# Patient Record
Sex: MALE | Race: WHITE
[De-identification: names, ages, dates, MRNs, and addresses within clinical notes are randomized per-mention and may not be internally consistent; named-entity substitution may affect disease eponyms.]

---

## 2023-12-11 ENCOUNTER — HOSPITAL ENCOUNTER (OUTPATIENT)
Dept: HOSPITAL 103 - HO.LNP | Age: 63
Discharge: HOME | End: 2023-12-11
Payer: MEDICARE

## 2023-12-11 DIAGNOSIS — E78.1: ICD-10-CM

## 2023-12-11 DIAGNOSIS — Z00.00: Primary | ICD-10-CM

## 2023-12-11 DIAGNOSIS — R73.03: ICD-10-CM

## 2023-12-11 DIAGNOSIS — I10: ICD-10-CM

## 2023-12-11 DIAGNOSIS — Z12.5: ICD-10-CM

## 2023-12-11 LAB
ALANINE AMINOTRANSFERASE: 48 U/L (ref 0–40)
ALBUMIN LEVEL: 4.1 G/DL (ref 3.5–5)
ALKALINE PHOSPHATASE: 67 U/L (ref 39–117)
ANION GAP: 15 (ref 12–20)
ASPARTATE AMINO TRANSFERASE: 44 U/L (ref 5–37)
BLOOD UREA NITROGEN: 20 MG/DL (ref 9–16)
CALCIUM: 9.4 MG/DL (ref 8.4–10.2)
CARBON DIOXIDE: 25 MMOL/L (ref 22–29)
CHLORIDE: 106 MMOL/L (ref 96–108)
CHOLESTEROL: 198 MG/DL (ref ?–200)
ESTIMATED GLOMERULAR FILT RATE: > 60
HDL CHOLESTEROL: 35 MG/DL (ref 40–?)
HEMATOCRIT: 42.8 % (ref 42–52)
HEMOGLOBIN: 14.6 G/DL (ref 14–18)
IMM GRANULOCYTES # BLD: 0.02 X10*3/UL (ref 0–0.03)
IMM GRANULOCYTES NFR BLD AUTO: 0.3 % (ref 0–0.4)
LYMPHOCYTES  ABSOLUTE AUTO: 2.6 X10*3/UL (ref 1.2–4.9)
MANUAL DIFF FLAG: NO
MEAN CORPUSCULAR HEMOGLOBIN: 32.3 PG (ref 27–33)
MEAN CORPUSCULAR HGB CONC: 34.1 G/DL (ref 31–36)
MEAN CORPUSCULAR VOLUME: 94.7 FL (ref 80–98)
MICROALBUM/CREATININE RATIO UR: (no result) UG/MG CR (ref ?–30)
NRBC ABS AUTO: 0 X10*3/UL (ref 0–0.01)
NRBC PCT AUTO: 0 /100WBC (ref 0–0.2)
PH SPEC: 5.5 [PH] (ref 5–9)
PLATELET COUNT: 268 X10*3/UL (ref 160–400)
POTASSIUM: 4 MMOL/L (ref 3.3–5.1)
PSA SERPL-MCNC: 1.49 NG/ML (ref 0–4)
RED BLOOD COUNT: 4.52 X10*6/UL (ref 4.6–5.8)
SODIUM: 142 MMOL/L (ref 135–145)
SP GR UR STRIP: 1.01 (ref 1–1.02)
TOTAL PROTEIN: 7.2 G/DL (ref 6.5–8)
TRIGLYCERIDES: 297 MG/DL (ref ?–150)
WHITE BLOOD COUNT: 6.3 X10*3/UL (ref 4.8–10.8)

## 2023-12-11 PROCEDURE — 83036 HEMOGLOBIN GLYCOSYLATED A1C: CPT

## 2023-12-11 PROCEDURE — 82570 ASSAY OF URINE CREATININE: CPT

## 2023-12-11 PROCEDURE — 82043 UR ALBUMIN QUANTITATIVE: CPT

## 2023-12-11 PROCEDURE — 80061 LIPID PANEL: CPT

## 2023-12-11 PROCEDURE — 80053 COMPREHEN METABOLIC PANEL: CPT

## 2023-12-11 PROCEDURE — 85025 COMPLETE CBC W/AUTO DIFF WBC: CPT

## 2023-12-11 PROCEDURE — 84153 ASSAY OF PSA TOTAL: CPT

## 2023-12-11 PROCEDURE — 81001 URINALYSIS AUTO W/SCOPE: CPT

## 2024-03-08 ENCOUNTER — APPOINTMENT (RX ONLY)
Dept: URBAN - METROPOLITAN AREA CLINIC 100 | Facility: CLINIC | Age: 64
Setting detail: DERMATOLOGY
End: 2024-03-08

## 2024-03-08 DIAGNOSIS — L57.0 ACTINIC KERATOSIS: ICD-10-CM

## 2024-03-08 DIAGNOSIS — D22 MELANOCYTIC NEVI: ICD-10-CM

## 2024-03-08 DIAGNOSIS — L81.4 OTHER MELANIN HYPERPIGMENTATION: ICD-10-CM

## 2024-03-08 DIAGNOSIS — L82.1 OTHER SEBORRHEIC KERATOSIS: ICD-10-CM

## 2024-03-08 DIAGNOSIS — D49.2 NEOPLASM OF UNSPECIFIED BEHAVIOR OF BONE, SOFT TISSUE, AND SKIN: ICD-10-CM

## 2024-03-08 DIAGNOSIS — Z85.820 PERSONAL HISTORY OF MALIGNANT MELANOMA OF SKIN: ICD-10-CM

## 2024-03-08 PROBLEM — D22.61 MELANOCYTIC NEVI OF RIGHT UPPER LIMB, INCLUDING SHOULDER: Status: ACTIVE | Noted: 2024-03-08

## 2024-03-08 PROBLEM — D22.62 MELANOCYTIC NEVI OF LEFT UPPER LIMB, INCLUDING SHOULDER: Status: ACTIVE | Noted: 2024-03-08

## 2024-03-08 PROBLEM — D22.5 MELANOCYTIC NEVI OF TRUNK: Status: ACTIVE | Noted: 2024-03-08

## 2024-03-08 PROBLEM — D23.62 OTHER BENIGN NEOPLASM OF SKIN OF LEFT UPPER LIMB, INCLUDING SHOULDER: Status: ACTIVE | Noted: 2024-03-08

## 2024-03-08 PROCEDURE — 17003 DESTRUCT PREMALG LES 2-14: CPT

## 2024-03-08 PROCEDURE — 17000 DESTRUCT PREMALG LESION: CPT | Mod: 59

## 2024-03-08 PROCEDURE — ? BIOPSY BY SHAVE METHOD

## 2024-03-08 PROCEDURE — 11102 TANGNTL BX SKIN SINGLE LES: CPT

## 2024-03-08 PROCEDURE — ? LIQUID NITROGEN

## 2024-03-08 PROCEDURE — 99203 OFFICE O/P NEW LOW 30 MIN: CPT | Mod: 25

## 2024-03-08 PROCEDURE — ? COUNSELING

## 2024-03-08 PROCEDURE — ? DIAGNOSIS COMMENT

## 2024-03-08 ASSESSMENT — LOCATION ZONE DERM
LOCATION ZONE: FACE
LOCATION ZONE: NECK
LOCATION ZONE: ARM
LOCATION ZONE: LEG
LOCATION ZONE: TRUNK

## 2024-03-08 ASSESSMENT — LOCATION DETAILED DESCRIPTION DERM
LOCATION DETAILED: LEFT SUPERIOR UPPER BACK
LOCATION DETAILED: SUPERIOR THORACIC SPINE
LOCATION DETAILED: RIGHT SUPERIOR UPPER BACK
LOCATION DETAILED: LEFT PROXIMAL POSTERIOR UPPER ARM
LOCATION DETAILED: STERNUM
LOCATION DETAILED: RIGHT LATERAL MALAR CHEEK
LOCATION DETAILED: RIGHT PROXIMAL POSTERIOR UPPER ARM
LOCATION DETAILED: RIGHT SUPERIOR LATERAL NECK
LOCATION DETAILED: LEFT LATERAL ABDOMEN
LOCATION DETAILED: RIGHT PROXIMAL CALF

## 2024-03-08 ASSESSMENT — LOCATION SIMPLE DESCRIPTION DERM
LOCATION SIMPLE: LEFT UPPER ARM
LOCATION SIMPLE: ABDOMEN
LOCATION SIMPLE: RIGHT UPPER BACK
LOCATION SIMPLE: RIGHT CHEEK
LOCATION SIMPLE: LEFT UPPER BACK
LOCATION SIMPLE: RIGHT CALF
LOCATION SIMPLE: UPPER BACK
LOCATION SIMPLE: CHEST
LOCATION SIMPLE: RIGHT UPPER ARM
LOCATION SIMPLE: NECK

## 2024-03-08 NOTE — PROCEDURE: LIQUID NITROGEN
Show Aperture Variable?: Yes
Detail Level: Detailed
Render Post-Care Instructions In Note?: no
Consent: The patient's consent was obtained including but not limited to risks of crusting, scabbing, blistering, scarring, darker or lighter pigmentary change, recurrence, incomplete removal and infection.
Post-Care Instructions: I reviewed with the patient in detail post-care instructions. Patient is to wear sunprotection, and avoid picking at any of the treated lesions. Pt may apply Vaseline to crusted or scabbing areas.
Number Of Freeze-Thaw Cycles: 1 freeze-thaw cycle
Duration Of Freeze Thaw-Cycle (Seconds): 0
Application Tool (Optional): Liquid Nitrogen Sprayer

## 2024-03-08 NOTE — PROCEDURE: BIOPSY BY SHAVE METHOD
Detail Level: Detailed
Depth Of Biopsy: dermis
Was A Bandage Applied: Yes
Size Of Lesion In Cm: 1.2
X Size Of Lesion In Cm: 0
Biopsy Type: H and E
Biopsy Method: 15 blade
Anesthesia Type: 1% lidocaine with epinephrine
Anesthesia Volume In Cc: 0.5
Hemostasis: Aluminum Chloride and Electrocautery
Wound Care: Mupirocin
Dressing: bandage
Destruction After The Procedure: No
Type Of Destruction Used: Curettage
Curettage Text: The wound bed was treated with curettage after the biopsy was performed.
Cryotherapy Text: The wound bed was treated with cryotherapy after the biopsy was performed.
Electrodesiccation Text: The wound bed was treated with electrodesiccation after the biopsy was performed.
Electrodesiccation And Curettage Text: The wound bed was treated with electrodesiccation and curettage after the biopsy was performed.
Silver Nitrate Text: The wound bed was treated with silver nitrate after the biopsy was performed.
Lab: -[id number]
Consent: Written consent was obtained and risks were reviewed including but not limited to scarring, infection, bleeding, scabbing, incomplete removal, nerve damage and allergy to anesthesia.
Post-Care Instructions: I reviewed with the patient in detail post-care instructions. Patient is to keep the biopsy site dry overnight, and then apply Vaseline or Aquaphor twice daily until healed. Patient may apply hydrogen peroxide soaks to remove any crusting.
Notification Instructions: Patient will be notified of biopsy results. However, patient instructed to call the office if not contacted within 2 weeks.
Billing Type: Third-Party Bill
Information: Selecting Yes will display possible errors in your note based on the variables you have selected. This validation is only offered as a suggestion for you. PLEASE NOTE THAT THE VALIDATION TEXT WILL BE REMOVED WHEN YOU FINALIZE YOUR NOTE. IF YOU WANT TO FAX A PRELIMINARY NOTE YOU WILL NEED TO TOGGLE THIS TO 'NO' IF YOU DO NOT WANT IT IN YOUR FAXED NOTE.

## 2024-07-05 ENCOUNTER — APPOINTMENT (RX ONLY)
Dept: URBAN - METROPOLITAN AREA CLINIC 100 | Facility: CLINIC | Age: 64
Setting detail: DERMATOLOGY
End: 2024-07-05

## 2024-07-05 DIAGNOSIS — Z85.820 PERSONAL HISTORY OF MALIGNANT MELANOMA OF SKIN: ICD-10-CM

## 2024-07-05 DIAGNOSIS — L82.1 OTHER SEBORRHEIC KERATOSIS: ICD-10-CM

## 2024-07-05 DIAGNOSIS — L81.4 OTHER MELANIN HYPERPIGMENTATION: ICD-10-CM

## 2024-07-05 DIAGNOSIS — D49.2 NEOPLASM OF UNSPECIFIED BEHAVIOR OF BONE, SOFT TISSUE, AND SKIN: ICD-10-CM

## 2024-07-05 DIAGNOSIS — L57.0 ACTINIC KERATOSIS: ICD-10-CM

## 2024-07-05 DIAGNOSIS — D22 MELANOCYTIC NEVI: ICD-10-CM

## 2024-07-05 PROBLEM — D22.5 MELANOCYTIC NEVI OF TRUNK: Status: ACTIVE | Noted: 2024-07-05

## 2024-07-05 PROBLEM — D22.62 MELANOCYTIC NEVI OF LEFT UPPER LIMB, INCLUDING SHOULDER: Status: ACTIVE | Noted: 2024-07-05

## 2024-07-05 PROBLEM — D23.62 OTHER BENIGN NEOPLASM OF SKIN OF LEFT UPPER LIMB, INCLUDING SHOULDER: Status: ACTIVE | Noted: 2024-07-05

## 2024-07-05 PROBLEM — D22.61 MELANOCYTIC NEVI OF RIGHT UPPER LIMB, INCLUDING SHOULDER: Status: ACTIVE | Noted: 2024-07-05

## 2024-07-05 PROCEDURE — ? COUNSELING

## 2024-07-05 PROCEDURE — 11102 TANGNTL BX SKIN SINGLE LES: CPT

## 2024-07-05 PROCEDURE — ? LIQUID NITROGEN

## 2024-07-05 PROCEDURE — ? BIOPSY BY SHAVE METHOD

## 2024-07-05 PROCEDURE — 99213 OFFICE O/P EST LOW 20 MIN: CPT | Mod: 25

## 2024-07-05 PROCEDURE — ? DIAGNOSIS COMMENT

## 2024-07-05 PROCEDURE — 17003 DESTRUCT PREMALG LES 2-14: CPT

## 2024-07-05 PROCEDURE — 17000 DESTRUCT PREMALG LESION: CPT | Mod: 59

## 2024-07-05 ASSESSMENT — LOCATION ZONE DERM
LOCATION ZONE: FACE
LOCATION ZONE: TRUNK
LOCATION ZONE: ARM

## 2024-07-05 ASSESSMENT — LOCATION DETAILED DESCRIPTION DERM
LOCATION DETAILED: LEFT CENTRAL MALAR CHEEK
LOCATION DETAILED: STERNUM
LOCATION DETAILED: LEFT LATERAL FOREHEAD
LOCATION DETAILED: RIGHT INFERIOR CENTRAL MALAR CHEEK
LOCATION DETAILED: LEFT SUPERIOR UPPER BACK
LOCATION DETAILED: LEFT LATERAL ABDOMEN
LOCATION DETAILED: RIGHT PROXIMAL POSTERIOR UPPER ARM
LOCATION DETAILED: LEFT PROXIMAL POSTERIOR UPPER ARM
LOCATION DETAILED: RIGHT SUPERIOR LATERAL MALAR CHEEK
LOCATION DETAILED: RIGHT SUPERIOR UPPER BACK
LOCATION DETAILED: LEFT INFERIOR FOREHEAD
LOCATION DETAILED: SUPERIOR THORACIC SPINE

## 2024-07-05 ASSESSMENT — LOCATION SIMPLE DESCRIPTION DERM
LOCATION SIMPLE: UPPER BACK
LOCATION SIMPLE: RIGHT CHEEK
LOCATION SIMPLE: LEFT UPPER BACK
LOCATION SIMPLE: RIGHT UPPER BACK
LOCATION SIMPLE: CHEST
LOCATION SIMPLE: ABDOMEN
LOCATION SIMPLE: RIGHT UPPER ARM
LOCATION SIMPLE: LEFT UPPER ARM
LOCATION SIMPLE: LEFT CHEEK
LOCATION SIMPLE: LEFT FOREHEAD

## 2024-07-05 NOTE — PROCEDURE: COUNSELING
Quality 137: Melanoma: Continuity Of Care - Recall System: Patient information entered into a recall system that includes: target date for the next exam specified AND a process to follow up with patients regarding missed or unscheduled appointments
Detail Level: Detailed
When Should The Patient Follow-Up For Their Next Full-Body Skin Exam?: 4 Months
Detail Level: Generalized
Detail Level: Simple
Detail Level: Zone

## 2024-07-05 NOTE — PROCEDURE: BIOPSY BY SHAVE METHOD

## 2024-07-05 NOTE — PROCEDURE: LIQUID NITROGEN
Detail Level: Detailed
Show Applicator Variable?: Yes
Consent: The patient's consent was obtained including but not limited to risks of crusting, scabbing, blistering, scarring, darker or lighter pigmentary change, recurrence, incomplete removal and infection.
Render Note In Bullet Format When Appropriate: No
Application Tool (Optional): Liquid Nitrogen Sprayer
Duration Of Freeze Thaw-Cycle (Seconds): 0
Post-Care Instructions: I reviewed with the patient in detail post-care instructions. Patient is to wear sunprotection, and avoid picking at any of the treated lesions. Pt may apply Vaseline to crusted or scabbing areas.
Number Of Freeze-Thaw Cycles: 1 freeze-thaw cycle

## 2024-11-11 ENCOUNTER — APPOINTMENT (RX ONLY)
Dept: URBAN - METROPOLITAN AREA CLINIC 100 | Facility: CLINIC | Age: 64
Setting detail: DERMATOLOGY
End: 2024-11-11

## 2024-11-11 DIAGNOSIS — D22 MELANOCYTIC NEVI: ICD-10-CM

## 2024-11-11 DIAGNOSIS — L81.4 OTHER MELANIN HYPERPIGMENTATION: ICD-10-CM

## 2024-11-11 DIAGNOSIS — Z85.820 PERSONAL HISTORY OF MALIGNANT MELANOMA OF SKIN: ICD-10-CM

## 2024-11-11 DIAGNOSIS — L82.1 OTHER SEBORRHEIC KERATOSIS: ICD-10-CM

## 2024-11-11 PROBLEM — D22.61 MELANOCYTIC NEVI OF RIGHT UPPER LIMB, INCLUDING SHOULDER: Status: ACTIVE | Noted: 2024-11-11

## 2024-11-11 PROBLEM — D22.62 MELANOCYTIC NEVI OF LEFT UPPER LIMB, INCLUDING SHOULDER: Status: ACTIVE | Noted: 2024-11-11

## 2024-11-11 PROBLEM — D22.5 MELANOCYTIC NEVI OF TRUNK: Status: ACTIVE | Noted: 2024-11-11

## 2024-11-11 PROCEDURE — ? COUNSELING

## 2024-11-11 PROCEDURE — 99213 OFFICE O/P EST LOW 20 MIN: CPT

## 2024-11-11 PROCEDURE — ? DIAGNOSIS COMMENT

## 2024-11-11 ASSESSMENT — LOCATION SIMPLE DESCRIPTION DERM
LOCATION SIMPLE: RIGHT UPPER BACK
LOCATION SIMPLE: LEFT UPPER ARM
LOCATION SIMPLE: UPPER BACK
LOCATION SIMPLE: ABDOMEN
LOCATION SIMPLE: CHEST
LOCATION SIMPLE: RIGHT UPPER ARM
LOCATION SIMPLE: LEFT UPPER BACK

## 2024-11-11 ASSESSMENT — LOCATION DETAILED DESCRIPTION DERM
LOCATION DETAILED: LEFT SUPERIOR UPPER BACK
LOCATION DETAILED: SUPERIOR THORACIC SPINE
LOCATION DETAILED: RIGHT SUPERIOR UPPER BACK
LOCATION DETAILED: LEFT LATERAL ABDOMEN
LOCATION DETAILED: LEFT PROXIMAL POSTERIOR UPPER ARM
LOCATION DETAILED: RIGHT PROXIMAL POSTERIOR UPPER ARM
LOCATION DETAILED: STERNUM

## 2024-11-11 ASSESSMENT — LOCATION ZONE DERM
LOCATION ZONE: ARM
LOCATION ZONE: TRUNK

## 2024-12-19 ENCOUNTER — HOSPITAL ENCOUNTER (OUTPATIENT)
Dept: HOSPITAL 103 - HO.LNP | Age: 64
Discharge: HOME | End: 2024-12-19
Payer: MEDICARE

## 2024-12-19 DIAGNOSIS — Z00.00: Primary | ICD-10-CM

## 2024-12-19 DIAGNOSIS — E78.1: ICD-10-CM

## 2024-12-19 DIAGNOSIS — R73.09: ICD-10-CM

## 2024-12-19 DIAGNOSIS — Z12.5: ICD-10-CM

## 2024-12-19 DIAGNOSIS — I10: ICD-10-CM

## 2024-12-19 LAB
ALANINE AMINOTRANSFERASE: 56 U/L (ref 0–40)
ALBUMIN LEVEL: 4.2 G/DL (ref 3.5–5)
ALKALINE PHOSPHATASE: 63 U/L (ref 39–117)
ANION GAP: 12 (ref 12–20)
ASPARTATE AMINO TRANSFERASE: 45 U/L (ref 5–37)
BLOOD UREA NITROGEN: 20 MG/DL (ref 9–16)
CALCIUM: 9.5 MG/DL (ref 8.4–10.2)
CARBON DIOXIDE: 28 MMOL/L (ref 22–29)
CHLORIDE: 106 MMOL/L (ref 96–108)
CHOLESTEROL: 213 MG/DL (ref ?–200)
ESTIMATED GLOMERULAR FILT RATE: 59
HDL CHOLESTEROL: 32 MG/DL (ref 40–?)
HEMATOCRIT: 46.1 % (ref 42–52)
HEMOGLOBIN: 15.3 G/DL (ref 14–18)
IMM GRANULOCYTES # BLD: 0.01 X10*3/UL (ref 0–0.03)
IMM GRANULOCYTES NFR BLD AUTO: 0.2 % (ref 0–0.4)
LYMPHOCYTES  ABSOLUTE AUTO: 2.9 X10*3/UL (ref 1.2–4.9)
MANUAL DIFF FLAG: NO
MEAN CORPUSCULAR HEMOGLOBIN: 31.9 PG (ref 27–33)
MEAN CORPUSCULAR HGB CONC: 33.2 G/DL (ref 31–36)
MEAN CORPUSCULAR VOLUME: 96 FL (ref 80–98)
MICROALBUM/CREATININE RATIO UR: 4.9 UG/MG CR (ref ?–30)
NRBC ABS AUTO: 0 X10*3/UL (ref 0–0.01)
NRBC PCT AUTO: 0 /100WBC (ref 0–0.2)
PH SPEC: 5.5 [PH] (ref 5–9)
PLATELET COUNT: 297 X10*3/UL (ref 160–400)
POTASSIUM: 4.3 MMOL/L (ref 3.3–5.1)
PSA SERPL-MCNC: 1.32 NG/ML (ref 0–4)
RED BLOOD COUNT: 4.8 X10*6/UL (ref 4.6–5.8)
SODIUM: 142 MMOL/L (ref 135–145)
SP GR UR STRIP: 1.01 (ref 1–1.02)
TOTAL PROTEIN: 7.3 G/DL (ref 6.5–8)
TRIGLYCERIDES: 488 MG/DL (ref ?–150)
WHITE BLOOD COUNT: 6.4 X10*3/UL (ref 4.8–10.8)

## 2024-12-19 PROCEDURE — 80061 LIPID PANEL: CPT

## 2024-12-19 PROCEDURE — 82570 ASSAY OF URINE CREATININE: CPT

## 2024-12-19 PROCEDURE — 85025 COMPLETE CBC W/AUTO DIFF WBC: CPT

## 2024-12-19 PROCEDURE — 83036 HEMOGLOBIN GLYCOSYLATED A1C: CPT

## 2024-12-19 PROCEDURE — 84153 ASSAY OF PSA TOTAL: CPT

## 2024-12-19 PROCEDURE — 82043 UR ALBUMIN QUANTITATIVE: CPT

## 2024-12-19 PROCEDURE — 80053 COMPREHEN METABOLIC PANEL: CPT

## 2024-12-19 PROCEDURE — 81001 URINALYSIS AUTO W/SCOPE: CPT

## 2024-12-19 NOTE — XMS_ITS
Patient Health Record  
  
                          Created on: 2024  
  
  
John Stewart  
External Reference #: [id number]  
: 1960  
Sex: Male  
  
Demographics  
  
  
                                        Address             [address] [place]  [address]  
   
                                        Work Phone          [phone]  
   
                                        Home Phone          [phone]  
   
                                        Mobile Phone        [phone]  
   
                                        Email Address       [email or web address]  
   
                                        Preferred Language  en  
   
                                        Marital Status        
   
                                        Muslim Affiliation Unknown  
   
                                        Race                White  
   
                                        Ethnic Group        Not  or Lati  
no  
  
  
Author  
  
  
                                        Organization        Sulaiman Mercer MD  
   
                                        Address             [address]  
[address]  
[place], [place]  [address]  
   
                                        Phone               [phone]  
  
  
Support  
  
  
                          Name         Relationship Address      Phone  
   
                          Pat MINOO  Emergency Contact Unknown      [phone]  
[phone]  
   
                          John Stewart Guarantor    Unknown      [phone]  
  
  
Care Team Providers  
  
  
                                Care Team Member Name Role            Phone  
   
                                Sulaiman Mercer Primary Care Provider [phone]  
[phone]  
  
  
  
ALLERGIES  
No Known Allergies  
  
REASON FOR REFERRAL  
No Information  
  
MEDICATIONS  
  
  
                                        Medication          SIG (Take, Route,   
Frequency, Duration) Notes           Start Date      End Date        Status  
   
                                        Cyclobenzaprine HCl 5 MG 1 tablet as nee  
ded   
Orally Three times a   
day for 10 days                 2019                      Not-Taking  
   
                                                    HYDROcodone-Acetaminophen   
5-325 MG                                1 tablet as needed   
Orally every 6 hrs   
for 5 days                      12/10/2021                      Not-Taking  
   
                                                    Ventolin HFA * 108 (90   
Base) MCG/ACT                           2 puffs as needed   
Inhalation every 4   
hrs for 30 day(s)                 2018                      Not-Taking  
   
                                        Valsartan 80 MG     TAKE 1 TABLET BY   
MOUTH EVERY DAY                                                 Active  
   
                                        Scopolamine 1 MG/3DAYS APPLY 1 PATCH TO   
THE   
SKIN EVERY 3 DAYS   
EVERY 3 DAYS AS   
NEEDED 12 for 12                                                 Active  
   
                                        LORazepam 1 MG      1 tablet at bedtime   
as needed Orally Once   
a day for 4 days                 12/10/2021                      Not-Taking  
   
                                                    Paxlovid (300/100) 20 x 150   
MG & 10 x 100MG                         3 tablets Orally   
Twice a day for 5   
day(s)                          2024                      Active  
  
  
  
IMMUNIZATIONS  
  
  
                      Vaccine    Route      Administration Date Status     Comme  
[facility]  
   
                      Flu Vaccine Unknown    10/22/2013 Administered Given at   
rk;   
[facility]  
   
                      Flu Vaccine Unknown    10/07/2014 Administered [facility]  
   
                      Fluarix Quadrivalent Unknown    2016 Administered No  
[facility]  
   
                      Shingrix   IM Intramuscular 2018 Administered   
   
                      Shingrix   IM Intramuscular 2019 Administered   
   
                      Fluarix Quadrivalent Unknown    10/11/2020 Administered [facility]  
[facility]  
   
                      SARS-COV-2 Pfizer Unknown    2021 Administered   
   
                      SARS-COV-2 Pfizer Unknown    2021 Administered   
   
                      SARS-COV-2 Pfizer Unknown    10/06/2021 Administered   
   
                      Fluarix Quadrivalent Unknown    10/06/2021 Administered   
   
                      SARS-COV-2 Pfizer Unknown    2022 Administered [facility]  
   
                      Fluarix Quadrivalent Unknown    2022 Administered [facility]  
[facility]  
   
                      SARS-COV-2 Pfizer Unknown    2023 Administered   
  
  
  
SOCIAL HISTORY  
Tobacco Use:  
  
                                Social History Observation Description     Date  
   
                                Details (start date - stop date) Never Smoker     
 NA - NA  
  
Sex Assigned At Birth:  
  
                                        Social History Observation Description  
   
                                        Sex Assigned At Birth Unknown  
  
Tobacco Use/Smoking  
  
                                Question        Answer          Notes  
   
                                Patient is a    nonsmoker         
   
                                        Additional Findings: Tobacco Non-User Cu  
rrent non-smoker, currently using no   
form of tobacco                           
  
  
  
PROBLEMS  
  
  
                                                    Problem   
Type                      ICD Code                  Onset   
Dates                                   Problem   
Status          W/U Status      Risk            SNOMED Code     Notes  
   
                                        Problem             Lumbar disc   
disease (M51.9)            Active     confirmed             102171792    
   
                                        Problem             Essential   
hypertension   
(I10)                     Active       confirmed                 Essential   
hypertension   
(68060807)                                
   
                                        Problem             Prediabetes   
(R73.09)                  Active       confirmed                 Prediabetes   
(833244212)                               
   
                                        Problem             High   
triglycerides   
(E78.1)               Active     confirmed             721530487    
   
                                        Problem             Cervical disc   
disease (M50.90)            Active     confirmed             871026672    
   
                                        Problem             Sciatica of left   
side (M54.32)            Active     confirmed             54964069     
   
                                        Problem             Heberden's node   
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    
   
                                        Problem             Renal cyst   
(N28.1)               Active     confirmed             266310488    
   
                                        Problem             Kidney cyst,   
acquired (N28.1)            Active     confirmed             930087522    
   
                                        Problem             Adenomatous   
rectal polyp   
(D12.8)               Active     confirmed             [id number]   
   
                                        Problem             Skin melanoma   
(C43.9)               Active     confirmed             71304329     
  
  
  
VITAL SIGNS  
  
  
                          Blood pressure diastolic 70 mm Hg     2024     
   
                          Height       71 in        2024   weight is 205 a  
t home BP not taken no   
temp  
   
                          Blood pressure systolic 116 mm Hg    2024     
   
                          Weight       205 lbs      2024   weight is 205 a  
t home BP not taken no   
temp  
   
                          BMI          28.59 kg/m2  2024   weight is 205 a  
t home BP not taken no   
temp  
  
  
  
Encounters  
  
  
                      Encounter  Location   Date       Provider   Diagnosis  
   
                                                    Sulaiman Mercer MD                                      [address]   
[address] [place] [address] [phone]        2024          Sulaiman Mercer     Blood tests for   
routine general   
physical examination   
Z00.00 ; High   
triglycerides E78.1 ;   
Prediabetes R73.09   
and Essential   
hypertension I10  
   
                                                    Sulaiman Mercer MD                                      [address]   
[address] [place] [address] [id number]        2024          Sulaiman Mercer     Essential   
hypertension I10 and   
Skin melanoma C43.9  
   
                                                    Sulaiman Mercer MD                                      [address]   
[address] [place] [address] [id number]        2024          Sulaiman Mercer MD                                      [address]   
[address] [place] [address] [phone]        2024          Sulaiman Mercer MD                                      [address]   
[address] [place] [address] [id number]        2024          Sulaiman Mercer     COVID-19 U07.1  
  
  
  
ASSESSMENTS  
  
  
                      Encounter Date Diagnosis  Assessment Notes Treatment Notes  
 Treatment   
Clinical Notes  
   
                                        2024          Blood tests for   
routine general   
physical examination   
(ICD-10 - Z00.00)                                             
   
                                        2024          Essential   
hypertension (ICD-10   
- I10)                                              doing great on   
med, will   
continue current   
regiment                                  
   
                                        2024          Skin melanoma (ICD-1  
0   
- C43.9)                                            need note from   
[place]   
derm/ REQUEST   
MADE BY PHONE   
MESSAGE TO FAX   
THE OFFICE NOTES   
TO PCP                                    
   
                                        2024          COVID-19 (ICD-10 -   
U07.1)                                                        
   
                                        2024          High triglycerides   
(ICD-10 - E78.1)                                              
   
                                        2024          Prediabetes (ICD-10   
-   
R73.09)                                                       
   
                                        2024          Essential   
hypertension (ICD-10   
- I10)                                                        
  
  
  
PLAN OF TREATMENT  
Pending Test  
  
                                        Test Name           Order Date  
   
                                        Electrocardiogram (EKG) 2013  
   
                                        Electrocardiogram (EKG) 10/06/2017  
   
                                        Electrocardiogram (EKG) 2018  
   
                                        Electrocardiogram (EKG) 2019  
   
                                        CARDIOVASCULAR STRESS TEST 12/10/2021  
   
                                        CT ABD & PELVIS WITH CONTRAST 2017  
   
                                        Complete Blood Count Auto Diff   
4  
   
                                        Comprehensive Marathon. Panel Fast   
4  
   
                                        Lipid Panel         2024  
   
                                        PSA,Total (Free>4and<10) 2024  
   
                                        Microalbumin, Random 2024  
   
                                        Hemoglobin A1c      2024  
   
                                        UA ClnCatch+Micro w/rflx Cult 2024  
  
Next Appt  
  
                                                    Details  
   
                                                    Provider Name:Sulaiman Lopez  
ier, 2024 09:30:00 AM, [address],   
[address], [place], [place], [id number], [phone]  
  
  
  
Insurance Providers  
  
  
                                                    Payer   
Name                                    Payer   
Address                                 Payer   
Phone                                   Subscriber   
Number                                  Group   
Number                                  Insured   
Name                                    Patient   
Relationship   
to Insured                              Coverage   
Start   
Date                                    Coverage   
End Date  
   
                                                    BLUE   
CROSS   
AND BLUE   
SHIELD                                  [address]   
[address]   
[place], [place]   
[id number]                               [phone]               [id number]                            John Stewart                                    Self -   
patient is   
the insured                                           
  
  
  
MEDICAL (GENERAL) HISTORY  
Medical History  
  
                                        History             ICD Code  
   
                                                    colonoscopy 14, Dr. Raygoza  
ss - 5 year f/u; Colonoscopy done 10/18/19 by   
Dr. Prado - repeat 5 years                
   
                                        ultrasound kidneys need no further follo  
w up

## 2024-12-19 NOTE — XMS_ITS
Patient Health Record  
  
                          Created on: 2024  
  
  
NISREEN GANDHI  
External Reference #: [id number]  
: 1960  
Sex: Male  
  
Demographics  
  
  
                                        Address             [address] [place]  [address]  
   
                                        Home Phone          [phone]  
   
                                        Mobile Phone        [phone]  
   
                                        Email Address       [email or web address]  
   
                                        Preferred Language  en  
   
                                        Marital Status        
   
                                        Jew Affiliation Unknown  
   
                                        Race                White  
   
                                        Ethnic Group        Not  or Lati  
no  
  
  
Author  
  
  
                                        Organization        [facility]  
   
                                        Address             [address]  
[address]  
[place]  [address]  
   
                                        Phone               [phone]  
  
  
Support  
  
  
                          Name         Relationship Address      Phone  
   
                                NICOLAS GANDHI   Emergency Contact [address] [place]  [id number] [phone]  
   
                          NISREEN GNADHI Guarantor    Unknown      [phone]  
  
  
Care Team Providers  
  
  
                                Care Team Member Name Role            Phone  
   
                                Sylvie SEVERINO, Sulaiman Primary Care Provider John Bennett   Unavailable     [phone]  
  
  
  
ALLERGIES  
No Known Allergies  
  
REASON FOR REFERRAL  
No Information  
  
MEDICATIONS  
  
  
                                        Medication          SIG (Take, Route, Fr  
equency,   
Duration)       Notes           Start Date      End Date        Status  
   
                                        Valsartan 80 MG     TAKE 1 TABLET BY LARRY  
TH EVERY DAY   
Oral for 90                                                     Active  
   
                    Multivitamin Adults                                           
Active  
  
  
  
IMMUNIZATIONS  
  
  
                      Vaccine    Route      Administration Date Status     Comme  
nts  
   
                      Influenza  Unknown    10/01/2018 Administered   
   
                      Influenza  Unknown    2024 Administered   
  
  
  
SOCIAL HISTORY  
Sex Assigned At Birth:  
  
                                        Social History Observation Description  
   
                                        Sex Assigned At Birth Unknown  
  
  
  
PROBLEMS  
  
  
                                                    Problem   
Type                      ICD Code                  Onset   
Dates                                   Problem   
Status          W/U Status      Risk            SNOMED Code     Notes  
   
                                        Problem             Colon cancer   
screening   
(Z12.11)                  Active       confirmed                 Colon cancer   
screening   
(224985910)                               
   
                                        Problem             Encounter for   
screening for   
malignant   
neoplasm of   
colon (Z12.11)            Active     confirmed             [id number]    
   
                                        Problem             Hx of   
adenomatous   
colonic polyps   
(Z86.010)             Active     confirmed             [id number]    
   
                                        Problem             Abdominal pain,   
right upper   
quadrant   
(R10.11)              Active     confirmed             [id number]    
   
                                        Problem             Pre-procedural   
examination   
(Z01.818)             Active     confirmed             [id number]   
   
                                        Problem             Chronic GERD   
(K21.9)                   Active       confirmed                 Gastroesophagea  
l   
reflux disease   
(disorder)   
(893135675)                               
  
  
  
VITAL SIGNS  
  
  
                          Temperature  97.8 degrees Fahrenheit 2024     
   
                          Blood pressure diastolic 00 mm Hg     2024     
   
                          Height       71 in        2024     
   
                          Blood pressure systolic 000 mm Hg    2024     
   
                          Weight       214 lb 2 oz lbs 2024     
   
                          BMI          29.86 kg/m2  2024     
  
  
  
Encounters  
  
  
                      Encounter  Location   Date       Provider   Diagnosis  
   
                                                    [facility]   
[facility]                          [address]   
[address] [place] [address]       2024          John Prado        Colon cancer screeni  
ng   
Z12.11 ; Chronic GERD   
K21.9 and Hx of   
adenomatous colonic   
polyps Z86.010  
  
  
  
ASSESSMENTS  
  
  
                      Encounter Date Diagnosis  Assessment Notes Treatment Notes  
 Treatment   
Clinical Notes  
   
                                        2024          Colon cancer   
screening (ICD-10 -   
Z12.11)                                                       
   
                                        2024          Chronic GERD (ICD-10  
   
- K21.9)                                                      
   
                                        2024          Hx of adenomatous   
colonic polyps   
(ICD-10 - Z86.010)                                            
  
  
  
PLAN OF TREATMENT  
Future Test  
  
                                        Test Name           Order Date  
   
                                        COLONOSCOPY         2013  
   
                                        COLONOSCOPY         2019  
   
                                        UPPER GI ENDOSCOPY  2024  
   
                                        COLONOSCOPY         2024  
  
Next Appt  
  
                                                    Details  
   
                                                    Provider Name:John Prado  
, 2024 09:40:00 AM, [address] ,   
[place], [place],   
[id number], [phone]  
  
  
  
Insurance Providers  
  
  
                                                    Payer   
Name                                    Payer   
Address                                 Payer   
Phone                                   Subscriber   
Number                                  Group   
Number                                  Insured   
Name                                    Patient   
Relationship   
to Insured                              Coverage   
Start   
Date                                    Coverage   
End Date  
   
                                                    [facility] [address]   
[place] [address]                                [phone]               [id number]                            NISREEN GANDHI                                    Self - patient   
is the insured                                        
  
  
  
MEDICAL (GENERAL) HISTORY  
Medical History  
  
                                        History             ICD Code  
   
                                                    Colonoscopy 3-17--1 smal  
l tubular adenoma removed; also noted were small   
internal hemorrhoids and occasional diverticulosis; colonoscopy in   
2014-small tubular adenoma removed      
   
                                        IBS                   
   
                                        Hx of a bleeding ulcer which required tr  
ansfusions in high school.   
   
                                        Denies MI,DM,CVA,Lung disease,renal dise  
ase   
   
                                                    Chronic right upper quadrant  
 discomfort with a negative gallbladder   
ultrasound in                         
   
                                        Colonoscopy 10/2019 with a small tubular  
 adenoma   
   
                                        HTN                   
   
                                        Malignant melanoma on scalp    
  
Surgical History  
  
                                        Surgery             Date(Month/Year)  
   
                                        Left shoulder         
   
                                        Removal of malignant melanoma from scalp  
 in 2023

## 2024-12-19 NOTE — XMS_ITS
Progress note - 2024  
  
                          Created on: 2024  
  
  
John Stewart  
External Reference #: [id number]  
: 1960  
Sex: Male  
  
Demographics  
  
  
                                        Address             [address] [place]  [address]  
   
                                        Work Phone          [phone]  
   
                                        Home Phone          [phone]  
   
                                        Mobile Phone        [phone]  
   
                                        Email Address       [email or web address]  
   
                                        Preferred Language  en  
   
                                        Marital Status        
   
                                        Muslim Affiliation Unknown  
   
                                        Race                White  
   
                                        Ethnic Group        Not  or Lati  
no  
  
  
Author  
  
  
                                        Organization        [facility]  
   
                                        Address             [address]  
[address]  
[place], [place]  [id number]  
   
                                        Phone               [phone]  
  
  
Support  
  
  
                          Name         Relationship Address      Phone  
   
                          Pat MINOO  Emergency Contact Unknown      [phone]  
   
                          John Stewart Guarantor    Unknown      [phone]  
  
  
Care Team Providers  
  
  
                                Care Team Member Name Role            Phone  
   
                                Sulaiman Mercer Primary Care Provider [phone]  
  
  
  
REASON FOR VISIT  
FASTING LABS  
  
Encounters  
  
  
                      Encounter  Location   Date       Provider   Diagnosis  
   
                                                    [facility]ul[facility] Mercer MD                                      [address]   
[address] [place], [place] [id number]        2024          Sulaiman Mercer     Blood tests for   
routine general   
physical examination   
Z00.00 ; High   
triglycerides E78.1 ;   
Prediabetes R73.09   
and Essential   
hypertension I10  
  
  
  
ASSESSMENTS  
  
  
                      Encounter Date Diagnosis  Assessment Notes Treatment Notes  
 Treatment   
Clinical Notes  
   
                                        2024          Blood tests for   
routine general   
physical examination   
(ICD-10 - Z00.00)                                             
   
                                        2024          High triglycerides   
(ICD-10 - E78.1)                                              
   
                                        2024          Prediabetes (ICD-10   
-   
R73.09)                                                       
   
                                        2024          Essential   
hypertension (ICD-10   
- I10)                                                        
  
  
  
PLAN OF TREATMENT  
Pending Test  
  
                                        Test Name           Order Date  
   
                                        Complete Blood Count Auto Diff   
4  
   
                                        Comprehensive [place]. Panel Fast   
   
                                        Lipid Panel         2024  
   
                                        PSA,Total (Free>4and<10) 2024  
   
                                        Microalbumin, Random 2024  
   
                                        Hemoglobin A1c      2024  
   
                                        UA ClnCatch+Micro w/rflx Cult 2024  
  
Next Appt  
  
                                                    Details  
   
                                                    Provider Name:Sulaiman campos, 2024 09:30:00 AM, [address],   
[address], [place], [place], [id number], [phone]

## 2024-12-19 NOTE — XMS_ITS
Progress note - 2024  
  
                          Created on: 2024  
  
  
John Stewart  
External Reference #: [id number]  
: 1960  
Sex: Male  
  
Demographics  
  
  
                                        Address             [address] [place]  [address]  
   
                                        Work Phone          [phone]  
   
                                        Home Phone          [phone]  
   
                                        Mobile Phone        [phone]  
   
                                        Email Address       [email or web address]  
   
                                        Preferred Language  en  
   
                                        Marital Status        
   
                                        Yazidi Affiliation Unknown  
   
                                        Race                White  
   
                                        Ethnic Group        Not  or Lati  
no  
  
  
Author  
  
  
                                        Organization        [facility]  
   
                                        Address             [address]  
[address]  
[place], [place]  [address]  
   
                                        Phone               [phone]  
  
  
Support  
  
  
                          Name         Relationship Address      Phone  
   
                          Pat MINOO  Emergency Contact Unknown      [phone]  
[phone]  
   
                          John Stewart Guarantor    Unknown      [phone]  
  
  
Care Team Providers  
  
  
                                Care Team Member Name Role            Phone  
   
                                Sulamian Mercer Primary Care Provider [phone]  
[phone]  
  
  
  
ALLERGIES  
No Known Allergies  
  
REASON FOR VISIT  
Covid tested positive today was in [place] returned 3 days ago, c/o chills 
fatigue , headache sore throat, cough muscle aches, congestion nauseau diarrhea 
x 2days, Video [phone]  
  
MEDICATIONS  
  
  
                                        Medication          SIG (Take, Route,   
Frequency, Duration) Notes           Start Date      End Date        Status  
   
                                                    Ventolin HFA * 108 (90   
Base) MCG/ACT                           2 puffs as needed   
Inhalation every 4   
hrs for 30 day(s)                 2018                      Not-Taking  
   
                                        Scopolamine 1 MG/3DAYS APPLY 1 PATCH TO   
THE   
SKIN EVERY 3 DAYS   
EVERY 3 DAYS AS   
NEEDED for 12                                                   Not-Taking  
   
                                        Valsartan 80 MG     TAKE 1 TABLET BY   
MOUTH EVERY DAY                                                 Active  
   
                                        LORazepam 1 MG      1 tablet at bedtime   
as needed Orally Once   
a day for 4 days                 12/10/2021                      Not-Taking  
   
                                        Cyclobenzaprine HCl 5 MG 1 tablet as nee  
ded   
Orally Three times a   
day for 10 days                 2019                      Not-Taking  
   
                                                    HYDROcodone-Acetaminophen   
5-325 MG                                1 tablet as needed   
Orally every 6 hrs   
for 5 days                      12/10/2021                      Not-Taking  
   
                                                    Paxlovid (300/100) 20 x 150   
MG & 10 x 100MG                         3 tablets Orally   
Twice a day for 5   
day(s)                          2024                      Active  
  
  
  
VITAL SIGNS  
  
  
                                BMI             28.59 kg/m2     2024  
   
                                Height          71 in           2024  
   
                                Weight          205 lbs         2024  
   
                                                    weight is 205 at home BP not  
 taken no temp   
  
  
  
Encounters  
  
  
                      Encounter  Location   Date       Provider   Diagnosis  
   
                                        Sulaiman Mercer MD [address] [place] [address] Drive   
[address] [place], [place]   
[id number]           2024          Sulaiman Mercer     COVID-19 U07.1  
  
  
  
ASSESSMENTS  
  
  
                      Encounter Date Diagnosis  Assessment Notes Treatment Notes  
 Treatment Clinical   
Notes  
   
                                        2024          COVID-19 (ICD-10 -   
U07.1)                                                        
  
  
  
PLAN OF TREATMENT  
Medication  
  
                      Medication Name Sig        Start Date Stop Date  Notes  
   
                                                    Paxlovid (300/100) 20 x 150   
MG   
& 10 x 100MG                            3 tablets Orally Twice a   
day for 5 day(s)    2024                                
  
Next Appt  
  
                                                    Details  
   
                                                    Provider Name:Sulaiman Lopez  
ier, 2024 09:30:00 AM, [address] [place] [address],   
[address], [place], [place], [id number], [phone]  
  
  
  
Progress Notes  
*   
  
Examination  
  
  
  
                          Category     Sub-Category Detail       Notes  
   
                                General Examination GENERAL APPEARANCE: alert, w  
ell hydrated, in no distress ,   
male   
  
  
  
                                        HEAD:               normocephalic   
  
  
  
  
  
History and Physical Notes  
*   
  
HPI (History of Present Illness)  
  
  
  
                          Category     Sub-Category Detail       Notes  
   
                                Symptom(s)      Telehealth      Location of prov  
ider rendering services::  [address]                          
  
  
  
                                        Location of patient:: at address listed   
in demographics for today's visit   
   
                                        Patient identification confirmed using::  
 Name, , SSN, Insurance information   
   
                                                    Telehealth method:: Video co  
nference where patient is visible to the provider of  
  
care                                      
   
                                                    Consent:: Patient verbally c  
onsented to treatment, Patient verbally consented to  
  
billing insurance company, Patient informed of any privacy concerns related to   
method of visit

## 2024-12-19 NOTE — XMS_ITS
Progress note - 2024  
  
                          Created on: 2024  
  
  
John Stewart  
External Reference #: [id number]  
: 1960  
Sex: Male  
  
Demographics  
  
  
                                        Address             [address] [place]  [address]  
   
                                        Work Phone          [phone]  
   
                                        Home Phone          [phone]  
   
                                        Mobile Phone        [phone]  
   
                                        Email Address       [email or web address]  
   
                                        Preferred Language  en  
   
                                        Marital Status        
   
                                        Muslim Affiliation Unknown  
   
                                        Race                White  
   
                                        Ethnic Group        Not  or Lati  
no  
  
  
Author  
  
  
                                        Organization        [facility]  
   
                                        Address             [address]  
[address] [place] [address]  [id number]  
   
                                        Phone               [phone]  
  
  
Support  
  
  
                          Name         Relationship Address      Phone  
   
                          Pat MINOO  Emergency Contact Unknown      [phone]  
[phone]  
   
                          John Stewart Guarantor    Unknown      [phone]  
  
  
Care Team Providers  
  
  
                                Care Team Member Name Role            Phone  
   
                                Sulaiman Mercer Primary Care Provider [phone]  
[phone]  
  
  
  
REASON FOR VISIT  
ER visit rec'd  
  
Encounters  
  
  
                      Encounter  Location   Date       Provider   Diagnosis  
   
                                        Sulaiman Mercer MD [address]  
[address]   
[place], [place] [id number]24          Sulaiman Mercer       
  
  
  
PLAN OF TREATMENT  
Next Appt  
  
                                                    Details  
   
                                                    Provider Name:Sulaiman Lopez  
ier, 2024 09:30:00 AM, [address] [place] [address],   
[address], [place], [place], [id number], [phone]

## 2024-12-19 NOTE — XMS_ITS
Progress note - 2024  
  
                          Created on: 2024  
  
  
NISREEN GANDHI  
External Reference #: [id number]  
: 1960  
Sex: Male  
  
Demographics  
  
  
                                        Address             [address] [place]  [address]  
   
                                        Home Phone          [phone]  
   
                                        Mobile Phone        [phone]  
   
                                        Email Address       [email or web address]  
   
                                        Preferred Language  en  
   
                                        Marital Status        
   
                                        Islam Affiliation Unknown  
   
                                        Race                White  
   
                                        Ethnic Group        Not  or Lati  
no  
  
  
Author  
  
  
                                        Organization        [facility]  
   
                                        Address             [address]  
[address]  
[place]  [address]  
   
                                        Phone               [phone]  
  
  
Support  
  
  
                          Name         Relationship Address      Phone  
   
                                OKSANA NICOLAS   Emergency Contact [address] [place]  [id number] [phone]  
   
                          NISREEN GANDHI Guarantor    Unknown      [phone]  
  
  
Care Team Providers  
  
  
                                Care Team Member Name Role            Phone  
   
                                Sulaiman Mercer MD Primary Care Provider John Bennett     [phone]  
  
  
  
ALLERGIES  
No Known Allergies  
  
REASON FOR VISIT  
Patient presents today for a SCREENING COLON  
  
MEDICATIONS  
  
  
                                        Medication          SIG (Take, Route, Fr  
equency,   
Duration)       Notes           Start Date      End Date        Status  
   
                                        Valsartan 80 MG     TAKE 1 TABLET BY LARRY  
TH EVERY DAY   
Oral for 90                                                     Active  
   
                    Multivitamin Adults                                           
Active  
  
  
  
PROBLEMS  
  
  
                                                    Problem   
Type                      ICD Code                  Onset   
Dates                                   Problem   
Status          W/U Status      Risk            SNOMED Code     Notes  
   
                                        Problem             Colon cancer   
screening   
(Z12.11)                  Active       confirmed                 Colon cancer   
screening   
([id number])                               
   
                                        Problem             Chronic GERD   
(K21.9)                   Active       confirmed                 Gastroesophagea  
l   
reflux disease   
(disorder)   
(097478433)                               
  
  
  
VITAL SIGNS  
  
  
                                BMI             29.86 kg/m2     2024  
   
                                Blood pressure systolic 000 mm Hg       20  
24  
   
                                Blood pressure diastolic 00 mm Hg          
024  
   
                                Height          71 in           2024  
   
                                Temperature     97.8 degrees Fahrenheit 20  
24  
   
                                Weight          214 lb 2 oz lbs 2024  
  
  
  
Encounters  
  
  
                      Encounter  Location   Date       Provider   Diagnosis  
   
                                                    [facility]   
[facility]                          [address]   
[address] [place] [id number]       2024          John Prado        Colon cancer screeni  
ng   
Z12.11 ; Chronic GERD   
K21.9 and Hx of   
adenomatous colonic   
polyps Z86.010  
  
  
  
ASSESSMENTS  
  
  
                      Encounter Date Diagnosis  Assessment Notes Treatment Notes  
 Treatment   
Clinical Notes  
   
                                        2024          Colon cancer   
screening (ICD-10 -   
Z12.11)                                                       
   
                                        2024          Chronic GERD (ICD-10  
   
- K21.9)                                                      
   
                                        2024          Hx of adenomatous   
colonic polyps   
(ICD-10 - Z86.010)                                            
  
  
  
PLAN OF TREATMENT  
Future Test  
  
                                        Test Name           Order Date  
   
                                        UPPER GI ENDOSCOPY  2024  
   
                                        COLONOSCOPY         2024  
  
Next Appt  
  
                                                    Details  
   
                                                    Follow Up: prn, Reason:  
   
                                                    Provider Name:John Prado  
, 2024 09:40:00 AM, [address] [place] [address],   
[id number], [phone]  
  
  
  
Progress Notes  
*   
  
Examination  
  
  
  
                          Category     Sub-Category Detail       Notes  
   
                                General Examination GENERAL APPEARANCE: pleasant  
, well nourished, well   
developed, in   
no acute distress   
  
  
  
                                        EYES:               sclera non-icteric   
   
                                        NECK/THYROID:       no cervical lymphade  
nopathy, neck supple   
   
                                        HEART:              S1, S2 normal   
   
                                        LUNGS:              clear to auscultatio  
n bilaterally   
   
                                        ABDOMEN:            normal bowel sounds,  
 no guarding or rigidity, no hepatosplenomegaly, no  
masses palpable, soft, nontender,   
nondistended.   
   
                                        NEUROLOGIC:         alert and oriented   
   
                                        SKIN:               nonjaundiced, no spi  
barbara angiomata.   
   
                                        EXTREMITIES:        no edema   
   
                                        ORAL CAVITY:        mucosa moist

## 2024-12-26 NOTE — XMS_ITS
Progress note - 2024  
  
                          Created on: 2024  
  
  
John Stewart  
External Reference #: [id number]  
: 1960  
Sex: Male  
  
Demographics  
  
  
                                        Address             [address] [place]  [address]  
   
                                        Work Phone          [phone]  
   
                                        Home Phone          [phone]  
   
                                        Mobile Phone        [phone]  
   
                                        Email Address       [email or web address]  
   
                                        Preferred Language  en  
   
                                        Marital Status        
   
                                        Muslim Affiliation Unknown  
   
                                        Race                White  
   
                                        Ethnic Group        Not  or Lati  
no  
  
  
Author  
  
  
                                        Organization        Sulaiman Mercer MD  
   
                                        Address             [address]  
[address]  
[place], [place]  [id number]  
   
                                        Phone               [phone]  
  
  
Support  
  
  
                          Name         Relationship Address      Phone  
   
                          MINOO Stewart  Emergency Contact Unknown      [phone]  
   
                          John Stewart Guarantor    Unknown      [phone]  
  
  
Care Team Providers  
  
  
                                Care Team Member Name Role            Phone  
   
                                Sulaiman Mercer Primary Care Provider [phone]  
  
  
  
RESULTS  
  
  
                          Component    Value        Reference Range Notes  
   
                                                    Complete Blood Count Auto Di  
ff  
Reviewed date:2024 04:43:11 PM  
Interpretation:  
Performing Lab:[facility], [address] [place] [phone]  
Notes/Report:   
   
                          White Blood Count 6.4          4.8-10.8 X10*3/uL   
   
                          Red Blood Count 4.80         4.60-5.80 X10*6/uL   
   
                          Hemoglobin   15.3         14.0-18.0 g/dl   
   
                          Hematocrit   46.1         42.0-52.0 %    
   
                          Mean Corpuscular Volume 96.0         80.0-98.0 fL   
   
                          Mean Corpuscular Hemoglobin 31.9         27.0-33.0 pg   
   
                          Mean Corpuscular HGB Conc 33.2         31.0-36.0 g/dl   
   
                          Red Cell Distribution Width 12.4         11.0-16.0 %    
   
                          Platelet Count 297          160-400 X10*3/uL   
   
                          Mean Platelet Volume 10.5         9.4-12.4 fL    
   
                          Neutrophils Percent Auto 43.0         45-73 %        
   
                          Imm Gran Pct Auto 0.2          0.0-0.4 %      
   
                          Lymphocytes Percent Auto 45.3         20-40 %        
   
                          Monocytes Percent Auto 9.1          2-11 %         
   
                          Eosinophils Percent Auto 1.9          0-4 %          
   
                          Basophils Percent Auto 0.5          0-2 %          
   
                          NRBC Pct Auto 0.0          0.0-0.2 /100WBC   
   
                          Neutrophils Absolute Auto 2.7          2.0-8.3 x10*3/u  
L   
   
                          Imm Gran Abs Auto 0.01         0.00-0.03 X10*3/uL   
   
                          Lymphocytes Absolute Auto 2.9          1.2-4.9 X10*3/u  
L   
   
                          Monocytes Absolute Auto 0.6          0.1-1.2 X10*3/uL   
   
                          Eosinophils Absolute Auto 0.1          0.0-0.4 X10*3/u  
L   
   
                          Basophils Absolute Auto 0.0          0.0-0.2 X10*3/uL   
   
                          NRBC Abs Auto 0.000        0.0-0.012 X10*3/uL   
   
                                                    [facility] [place]. Panel Fa  
st  
Reviewed date:2024 04:42:33 PM  
Interpretation:  
Performing Lab:[facility], [address] [place] [phone]  
Notes/Report:   
   
                          Sodium       142          135-145 mmol/L   
   
                          Potassium    4.3          3.3-5.1 mmol/L   
   
                          Chloride     106           mmol/L   
   
                          Carbon Dioxide 28           22-29 mmol/L   
   
                          Anion Gap    12           12-20          
   
                          Blood Urea Nitrogen 20           9-16 mg/dL     
   
                          Creatinine   1.23         0.5-1.4 mg/dL   
   
                          Estimated Glomerular Filt Rate 59                       
     
  
  
Chronic Kidney Disease:   
Estimated GFR < 60   
mL/min/1.73m2  
  
  
Severe Kidney Disease:   
Estimated GFR < 15   
mL/min/1.73m2  
   
                          Glucose Fasting 108          60-99 mg/dL    
  
  
A fasting glucose from   
100-125 mg/dl is   
considered impaired  
  
  
(pre-diabetes).  
   
                          Calcium      9.5          8.4-10.2 mg/dL   
   
                          Bilirubin Total 0.7          0.0-1.0 mg/dL   
   
                          Aspartate Amino Transferase 45           5-37 U/L       
   
                          Alanine Aminotransferase 56           0-40 U/L       
   
                          Total Protein 7.3          6.5-8.0 g/dL   
   
                          Albumin Level 4.2          3.5-5.0 g/dL   
   
                          Alkaline Phosphatase 63            U/L     
   
                                                    Lipid Panel  
Reviewed date:2024 04:43:23 PM  
Interpretation:  
Performing Lab:[facility], [address] [place] [phone]  
Notes/Report:   
   
                          Triglycerides 488          <150 mg/dL     
  
  
Desirable Triglyceride:   
less than 150 mg/dL  
  
  
Borderline High   
Triglyceride 150-199 mg/dL  
  
  
High Triglyceride: 200-499   
mg/dL  
  
  
Very High Triglyceride:   
greater than or equal to  
  
  
5OO mg/dL  
   
                          Cholesterol  213          <200 mg/dL     
  
  
Desirable Cholesterol:   
less than 200 mg/dL  
  
  
Borderline High   
Cholesterol: 200-239 mg/dL  
  
  
High Cholesterol: greater   
than 239 mg/dL  
   
                          LDL Cholesterol Calculated TNP          <100 mg/dL     
  
  
Unable to calculate the   
LDL. The formula of   
Friedwald,  
  
  
Root, and Roger is   
only valid if the   
triglycerides are  
  
  
less than 400 mg/dl.  
   
                          HDL Cholesterol 32           >40 mg/dL      
  
  
Desirable HDL: greater   
than 40 mg/dL  
  
  
Note: This HDL assay may   
give artificially  
  
  
low results in patients   
with liver disease.  
   
                                                    PSA,Total (Free>4and<10)  
Reviewed date:2024 04:42:40 PM  
Interpretation:  
Performing Lab:[address] [phone]  
Notes/Report:   
   
                          PSA,Total (Free>4and<10) 1.32         0.00-4.00 ng/mL   
  
  
A Free PSA was not   
performed:  
  
  
The percentage of Free PSA   
can be used to enhance the  
  
  
differentiation of   
prostate cancer from   
benign prostatic  
  
  
disease in subjects whose   
PSA levels are between 4.0   
and  
  
  
10.0 ng/mL. For subjects   
whose PSA levels are below   
4.0 or  
  
  
above 10.0 ng/mL, the risk   
of prostate cancer is   
determined  
  
  
on the basis of the PSA   
alone. Therefore the %   
Free PSA  
  
  
is recommended only for   
those subjects whose PSA   
levels  
  
  
are between 4.0 and 10.0   
ng/mL.  
  
  
PSA methodology: Abbott   
Alinity i Chemiluminescent  
  
  
Microparticle Immunoassay   
(CMIA)  
   
                                                    Microalbumin, Random  
Reviewed date:2024 04:42:47 PM  
Interpretation:  
Performing Lab:[facility], [address] [place] [phone]  
Notes/Report:   
   
                          Creatinine Urine 101.61                      
   
                          Microalbumin Urine 5.0                         
   
                          Microalbum/Creatinine Ratio Ur 4.9          <30 ug/mg   
cr   
  
  
Albumin/Creatinine Ratio   
Reference Ranges:  
  
  
Normal: < 30 ug/mg   
creatinine  
  
  
Microalbuminuria: 30 - 300   
ug/mg creatinine  
  
  
Clinical Albuminuria: >   
300 ug/mg creatinine  
   
                                                    Hemoglobin A1c  
Reviewed date:2024 04:42:24 PM  
Interpretation:  
Performing Lab:[facility], [address] [place] [phone]  
Notes/Report:   
   
                          Hemoglobin A1c % 5.6          <6.0 %         
  
  
Hemoglobin A1C Reference   
Range  
  
  
Adults: 4.8 - 6.0 %  
  
  
Non diabetic: < 6.0 %  
  
  
Goal: < 7.0 %  
  
  
Additional Action   
Suggested: > 8.0 %  
  
  
Note: Hemoglobin A1c   
results are invalid for   
patients  
  
  
with abnormal amounts of   
HbF. Blood transfusions  
  
  
may impact the HbA1c   
concentration in the   
patient  
  
  
sample.  
   
                          Estimated Average Glucose 114                         
  
  
eAG =  Estimated average   
glucose  which is %A1C   
expressed as  
  
  
average glucose, using the   
formula of the A1C-Derived  
  
  
Average Glucose study   
(ADAG), Diabetes Care,   
Vol.31,#8,  
  
  
Aug. 2008  
   
                                                    UA ClnCatch+Micro w/rflx Cul  
t  
Reviewed date:2024 04:43:46 PM  
Interpretation:  
Performing Lab:[facility], [address] [place] [phone]  
Notes/Report:  
Urine, Clean Catch   
   
                          Color Urine  Yellow                      
   
                          Appearance Urine Clear                       
   
                          PH           5.5          5.0-9.0        
   
                          Glucose Urine UA Negative     Negative mg/dL   
   
                          Urine Blood  Negative     Negative       
   
                          Specific Gravity - Urine 1.015        1.005-1.025    
   
                          Urine Protein Negative     Neg-Trace mg/dL   
   
                          Urine Ketones Negative     Negative mg/dL   
   
                          Nitrite Urine Negative     Negative       
   
                          Leukocyte Esterase Urine Negative     Negative       
   
                          RBC Urine    0-2          0-2 /HPF       
   
                          WBC Urine    0-5          0-5 /HPF       
   
                          Squamous Epithelial Cell Urine 0-2          0-2 /HPF    
     
   
                          Bacteria Urine None Seen    None Seen      
   
                          Hyaline Casts Urine 0-2          0-2 /LPF       
  
  
  
REASON FOR VISIT  
FASTING LABS  
  
Encounters  
  
  
                      Encounter  Location   Date       Provider   Diagnosis  
   
                                                    Sulaiman Mercer MD                                      [address] [place] [address] Drive   
[address] [place], [place] [id number]        2024          Sulaiman Mercer     Blood tests for   
routine general   
physical examination   
Z00.00 ; High   
triglycerides E78.1 ;   
Prediabetes R73.09   
and Essential   
hypertension I10  
  
  
  
ASSESSMENTS  
  
  
                      Encounter Date Diagnosis  Assessment Notes Treatment Notes  
 Treatment   
Clinical Notes  
   
                                        2024          Blood tests for   
routine general   
physical examination   
(ICD-10 - Z00.00)                                             
   
                                        2024          High triglycerides   
(ICD-10 - E78.1)                                              
   
                                        2024          Prediabetes (ICD-10   
-   
R73.09)                                                       
   
                                        2024          Essential   
hypertension (ICD-10   
- I10)                                                        
  
  
  
PLAN OF TREATMENT  
Next Appt  
  
                                                    Details  
   
                                                    Provider Name:Sulaiman campos, 2024 09:30:00 AM, [address] [place] [address],   
[address], [place], [place], [id number], [phone]

## 2024-12-26 NOTE — XMS_ITS
Patient Health Record  
  
                          Created on: 2024  
  
  
NISREEN GANDHI  
External Reference #: [id number]  
: 1960  
Sex: Male  
  
Demographics  
  
  
                                        Address             [address] [place]  [address]  
   
                                        Home Phone          [phone]  
   
                                        Mobile Phone        [phone]  
   
                                        Email Address       [email or web address]  
   
                                        Preferred Language  en  
   
                                        Marital Status        
   
                                        Muslim Affiliation Unknown  
   
                                        Race                White  
   
                                        Ethnic Group        Not  or Lati  
no  
  
  
Author  
  
  
                                        Organization        [facility]  
   
                                        Address             [address]  
[address]  
[place]  [address]  
   
                                        Phone               [phone]  
  
  
Support  
  
  
                          Name         Relationship Address      Phone  
   
                                NICOLAS GANDHI   Emergency Contact [address] [place]  [id number] [phone]  
   
                          NISREEN GANDHI Guarantor    Unknown      [phone]  
  
  
Care Team Providers  
  
  
                                Care Team Member Name Role            Phone  
   
                                Sylvie SEVERINO, Sulaiman Primary Care Provider John Bennett   Unavailable     [phone]  
  
  
  
ALLERGIES  
No Known Allergies  
  
REASON FOR REFERRAL  
No Information  
  
MEDICATIONS  
  
  
                                        Medication          SIG (Take, Route, Fr  
equency,   
Duration)       Notes           Start Date      End Date        Status  
   
                                        Valsartan 80 MG     TAKE 1 TABLET BY LARRY  
TH EVERY DAY   
Oral for 90                                                     Active  
   
                    Multivitamin Adults                                           
Active  
  
  
  
IMMUNIZATIONS  
  
  
                      Vaccine    Route      Administration Date Status     Comme  
nts  
   
                      Influenza  Unknown    10/01/2018 Administered   
   
                      Influenza  Unknown    2024 Administered   
  
  
  
SOCIAL HISTORY  
Sex Assigned At Birth:  
  
                                        Social History Observation Description  
   
                                        Sex Assigned At Birth Unknown  
  
  
  
PROBLEMS  
  
  
                                                    Problem   
Type                      ICD Code                  Onset   
Dates                                   Problem   
Status          W/U Status      Risk            SNOMED Code     Notes  
   
                                        Problem             Colon cancer   
screening   
(Z12.11)                  Active       confirmed                 Colon cancer   
screening   
(436698607)                               
   
                                        Problem             Encounter for   
screening for   
malignant   
neoplasm of   
colon (Z12.11)            Active     confirmed             [id number]    
   
                                        Problem             Hx of   
adenomatous   
colonic polyps   
(Z86.010)             Active     confirmed             [id number]    
   
                                        Problem             Abdominal pain,   
right upper   
quadrant   
(R10.11)              Active     confirmed             [id number]    
   
                                        Problem             Pre-procedural   
examination   
(Z01.818)             Active     confirmed             [id number]   
   
                                        Problem             Chronic GERD   
(K21.9)                   Active       confirmed                 Gastroesophagea  
l   
reflux disease   
(disorder)   
(706917194)                               
  
  
  
VITAL SIGNS  
  
  
                          Temperature  97.8 degrees Fahrenheit 2024     
   
                          Blood pressure diastolic 00 mm Hg     2024     
   
                          Height       71 in        2024     
   
                          Blood pressure systolic 000 mm Hg    2024     
   
                          Weight       214 lb 2 oz lbs 2024     
   
                          BMI          29.86 kg/m2  2024     
  
  
  
Encounters  
  
  
                      Encounter  Location   Date       Provider   Diagnosis  
   
                                                    [facility]   
[facility]                          [address]   
[address] [place] [address]       2024          John Prado        Colon cancer screeni  
ng   
Z12.11 ; Chronic GERD   
K21.9 and Hx of   
adenomatous colonic   
polyps Z86.010  
  
  
  
ASSESSMENTS  
  
  
                      Encounter Date Diagnosis  Assessment Notes Treatment Notes  
 Treatment   
Clinical Notes  
   
                                        2024          Colon cancer   
screening (ICD-10 -   
Z12.11)                                                       
   
                                        2024          Chronic GERD (ICD-10  
   
- K21.9)                                                      
   
                                        2024          Hx of adenomatous   
colonic polyps   
(ICD-10 - Z86.010)                                            
  
  
  
PLAN OF TREATMENT  
Future Test  
  
                                        Test Name           Order Date  
   
                                        COLONOSCOPY         2013  
   
                                        COLONOSCOPY         2019  
   
                                        UPPER GI ENDOSCOPY  2024  
   
                                        COLONOSCOPY         2024  
  
Next Appt  
  
                                                    Details  
   
                                                    Provider Name:John Prado  
, 2024 08:40:00 AM, [address] ,   
[place], [place],   
[id number], [phone]  
  
  
  
Insurance Providers  
  
  
                                                    Payer   
Name                                    Payer   
Address                                 Payer   
Phone                                   Subscriber   
Number                                  Group   
Number                                  Insured   
Name                                    Patient   
Relationship   
to Insured                              Coverage   
Start   
Date                                    Coverage   
End Date  
   
                                                    [facility] [address]   
[place] [address]                                [phone]               [id number]                            NISREEN GANDHI                                    Self - patient   
is the insured                                        
  
  
  
MEDICAL (GENERAL) HISTORY  
Medical History  
  
                                        History             ICD Code  
   
                                                    Colonoscopy 3-17--1 smal  
l tubular adenoma removed; also noted were small   
internal hemorrhoids and occasional diverticulosis; colonoscopy in   
2014-small tubular adenoma removed      
   
                                        IBS                   
   
                                        Hx of a bleeding ulcer which required tr  
ansfusions in high school.   
   
                                        Denies MI,DM,CVA,Lung disease,renal dise  
ase   
   
                                                    Chronic right upper quadrant  
 discomfort with a negative gallbladder   
ultrasound in                         
   
                                        Colonoscopy 10/2019 with a small tubular  
 adenoma   
   
                                        HTN                   
   
                                        Malignant melanoma on scalp    
  
Surgical History  
  
                                        Surgery             Date(Month/Year)  
   
                                        Left shoulder         
   
                                        Removal of malignant melanoma from scalp  
 in 2023

## 2024-12-26 NOTE — XMS_ITS
Progress note - 2024  
  
                          Created on: 2024  
  
  
John Stewart  
External Reference #: [id number]  
: 1960  
Sex: Male  
  
Demographics  
  
  
                                        Address             [address] [place]  [address]  
   
                                        Work Phone          [phone]  
   
                                        Home Phone          [phone]  
   
                                        Mobile Phone        [phone]  
   
                                        Email Address       [email or web address]  
   
                                        Preferred Language  en  
   
                                        Marital Status        
   
                                        Bahai Affiliation Unknown  
   
                                        Race                White  
   
                                        Ethnic Group        Not  or Lati  
no  
  
  
Author  
  
  
                                        Organization        [facility]  
   
                                        Address             [address]  
[address]  
[place], [place]  [id number]  
   
                                        Phone               [phone]  
  
  
Support  
  
  
                          Name         Relationship Address      Phone  
   
                          Pat MINOO  Emergency Contact Unknown      [phone]  
[phone]  
   
                          John Stewart Guarantor    Unknown      [phone]  
  
  
Care Team Providers  
  
  
                                Care Team Member Name Role            Phone  
   
                                Sulaiman Mercer Primary Care Provider [phone]  
[phone]  
  
  
  
ALLERGIES  
No Known Allergies  
  
REASON FOR VISIT  
Covid tested positive today was in [place] returned 3 days ago, c/o chills 
fatigue , headache sore throat, cough muscle aches, congestion nauseau diarrhea 
x 2days, Video [phone]  
  
MEDICATIONS  
  
  
                                        Medication          SIG (Take, Route,   
Frequency, Duration) Notes           Start Date      End Date        Status  
   
                                                    Ventolin HFA * 108 (90   
Base) MCG/ACT                           2 puffs as needed   
Inhalation every 4   
hrs for 30 day(s)                 2018                      Not-Taking  
   
                                        Scopolamine 1 MG/3DAYS APPLY 1 PATCH TO   
THE   
SKIN EVERY 3 DAYS   
EVERY 3 DAYS AS   
NEEDED for 12                                                   Not-Taking  
   
                                        Valsartan 80 MG     TAKE 1 TABLET BY   
MOUTH EVERY DAY                                                 Active  
   
                                        LORazepam 1 MG      1 tablet at bedtime   
as needed Orally Once   
a day for 4 days                 12/10/2021                      Not-Taking  
   
                                        Cyclobenzaprine HCl 5 MG 1 tablet as nee  
ded   
Orally Three times a   
day for 10 days                 2019                      Not-Taking  
   
                                                    HYDROcodone-Acetaminophen   
5-325 MG                                1 tablet as needed   
Orally every 6 hrs   
for 5 days                      12/10/2021                      Not-Taking  
   
                                                    Paxlovid (300/100) 20 x 150   
MG & 10 x 100MG                         3 tablets Orally   
Twice a day for 5   
day(s)                          2024                      Active  
  
  
  
VITAL SIGNS  
  
  
                                BMI             28.59 kg/m2     2024  
   
                                Height          71 in           2024  
   
                                Weight          205 lbs         2024  
   
                                                    weight is 205 at home BP not  
 taken no temp   
  
  
  
Encounters  
  
  
                      Encounter  Location   Date       Provider   Diagnosis  
   
                                        Sulaiman Mercer MD [address] [place] [address] Drive   
[address] [place], [place]   
[id number]           2024          Sulaiman Mercer     COVID-19 U07.1  
  
  
  
ASSESSMENTS  
  
  
                      Encounter Date Diagnosis  Assessment Notes Treatment Notes  
 Treatment Clinical   
Notes  
   
                                        2024          COVID-19 (ICD-10 -   
U07.1)                                                        
  
  
  
PLAN OF TREATMENT  
Medication  
  
                      Medication Name Sig        Start Date Stop Date  Notes  
   
                                                    Paxlovid (300/100) 20 x 150   
MG   
& 10 x 100MG                            3 tablets Orally Twice a   
day for 5 day(s)    2024                                
  
Next Appt  
  
                                                    Details  
   
                                                    Provider Name:Sulaiman Lopez  
ier, 2024 09:30:00 AM, [address] [place] [address],   
[address], [place], [place], [id number], [phone]  
  
  
  
Progress Notes  
*   
  
Examination  
  
  
  
                          Category     Sub-Category Detail       Notes  
   
                                General Examination GENERAL APPEARANCE: alert, w  
ell hydrated, in no distress ,   
male   
  
  
  
                                        HEAD:               normocephalic   
  
  
  
  
  
History and Physical Notes  
*   
  
HPI (History of Present Illness)  
  
  
  
                          Category     Sub-Category Detail       Notes  
   
                                Symptom(s)      Telehealth      Location of prov  
ider rendering services::  [address]                          
  
  
  
                                        Location of patient:: at address listed   
in demographics for today's visit   
   
                                        Patient identification confirmed using::  
 Name, , SSN, Insurance information   
   
                                                    Telehealth method:: Video co  
nference where patient is visible to the provider of  
  
care                                      
   
                                                    Consent:: Patient verbally c  
onsented to treatment, Patient verbally consented to  
  
billing insurance company, Patient informed of any privacy concerns related to   
method of visit

## 2024-12-26 NOTE — XMS_ITS
Progress note - 2024  
  
                          Created on: 2024  
  
  
John Stewart  
External Reference #: [id number]  
: 1960  
Sex: Male  
  
Demographics  
  
  
                                        Address             [address] [place]  [address]  
   
                                        Work Phone          [phone]  
   
                                        Home Phone          [phone]  
   
                                        Mobile Phone        [phone]  
   
                                        Email Address       [email or web address]  
   
                                        Preferred Language  en  
   
                                        Marital Status        
   
                                        Sikhism Affiliation Unknown  
   
                                        Race                White  
   
                                        Ethnic Group        Not  or Lati  
no  
  
  
Author  
  
  
                                        Organization        [facility]  
   
                                        Address             [address]  
[address] [place] [address]  [id number]  
   
                                        Phone               [phone]  
  
  
Support  
  
  
                          Name         Relationship Address      Phone  
   
                          Pat MINOO  Emergency Contact Unknown      [phone]  
[phone]  
   
                          John Stewart Guarantor    Unknown      [phone]  
  
  
Care Team Providers  
  
  
                                Care Team Member Name Role            Phone  
   
                                Sulaiman Mercer Primary Care Provider [phone]  
[phone]  
  
  
  
REASON FOR VISIT  
ER visit rec'd  
  
Encounters  
  
  
                      Encounter  Location   Date       Provider   Diagnosis  
   
                                        Sulaiman Mercer MD [address]  
[address]   
[place], [place] [id number] 2024          Sulaiman Mercer       
  
  
  
PLAN OF TREATMENT  
Next Appt  
  
                                                    Details  
   
                                                    Provider Name:Sulaiman Lopez  
ier, 2024 09:30:00 AM, [address] [place] [address],   
[address], [place], [place], [id number], [phone]

## 2024-12-26 NOTE — XMS_ITS
Progress note - 2024  
  
                          Created on: 2024  
  
  
NISREEN GANDHI  
External Reference #: [id number]  
: 1960  
Sex: Male  
  
Demographics  
  
  
                                        Address             [address] [place]  [address]  
   
                                        Home Phone          [phone]  
   
                                        Mobile Phone        [phone]  
   
                                        Email Address       [email or web address]  
   
                                        Preferred Language  en  
   
                                        Marital Status        
   
                                        Ep[employer] Affiliation Unknown  
   
                                        Race                White  
   
                                        Ethnic Group        Not  or Lati  
no  
  
  
Author  
  
  
                                        Organization        [facility]  
   
                                        Address             [address]  
[address]  
[place]  [address]  
   
                                        Phone               [phone]  
  
  
Support  
  
  
                          Name         Relationship Address      Phone  
   
                                [employer]   Emergency [employer] [address] [place]  [id number] [phone]  
   
                          NISREEN GANDHI Guarantor    Unknown      [phone]  
  
  
Care Team Providers  
  
  
                                Care Team Member Name Role            Phone  
   
                                Sulaiman Mercer MD Primary Care Provider John Bennett     [phone]  
  
  
  
ALLERGIES  
No Known Allergies  
  
REASON FOR VISIT  
Patient presents today for a SCREENING COLON  
  
MEDICATIONS  
  
  
                                        Medication          SIG (Take, Route, Fr  
equency,   
Duration)       Notes           Start Date      End Date        Status  
   
                                        Valsartan 80 MG     TAKE 1 TABLET BY LARRY  
TH EVERY DAY   
Oral for 90                                                     Active  
   
                    Multivitamin Adults                                           
Active  
  
  
  
PROBLEMS  
  
  
                                                    Problem   
Type                      ICD Code                  Onset   
Dates                                   Problem   
Status          W/U Status      Risk            SNOMED Code     Notes  
   
                                        Problem             Colon cancer   
screening   
(Z12.11)                  Active       confirmed                 Colon cancer   
screening   
(697127446)                               
   
                                        Problem             Chronic GERD   
(K21.9)                   Active       confirmed                 Gastroesophagea  
l   
reflux disease   
(disorder)   
(606010543)                               
  
  
  
VITAL SIGNS  
  
  
                                BMI             29.86 kg/m2     2024  
   
                                Blood pressure systolic 000 mm Hg       20  
24  
   
                                Blood pressure diastolic 00 mm Hg          
024  
   
                                Height          71 in           2024  
   
                                Temperature     97.8 degrees Fahrenheit 20  
24  
   
                                Weight          214 lb 2 oz lbs 2024  
  
  
  
Encounters  
  
  
                      Encounter  Location   Date       Provider   Diagnosis  
   
                                                    [facility]   
[facility]                          [address]   
[address] [place] [id number]       2024          John Prado        Colon cancer screeni  
ng   
Z12.11 ; Chronic GERD   
K21.9 and Hx of   
adenomatous colonic   
polyps Z86.010  
  
  
  
ASSESSMENTS  
  
  
                      Encounter Date Diagnosis  Assessment Notes Treatment Notes  
 Treatment   
Clinical Notes  
   
                                        2024          Colon cancer   
screening (ICD-10 -   
Z12.11)                                                       
   
                                        2024          Chronic GERD (ICD-10  
   
- K21.9)                                                      
   
                                        2024          Hx of adenomatous   
colonic polyps   
(ICD-10 - Z86.010)                                            
  
  
  
PLAN OF TREATMENT  
Future Test  
  
                                        Test Name           Order Date  
   
                                        UPPER GI ENDOSCOPY  2024  
   
                                        COLONOSCOPY         2024  
  
Next Appt  
  
                                                    Details  
   
                                                    Follow Up: prn, Reason:  
   
                                                    Provider Name:John Prado  
, 2024 08:40:00 AM, [address] [place] [address],   
[id number], [phone]  
  
  
  
Progress Notes  
*   
  
Examination  
  
  
  
                          Category     Sub-Category Detail       Notes  
   
                                General Examination GENERAL APPEARANCE: pleasant  
, well nourished, well   
developed, in   
no acute distress   
  
  
  
                                        EYES:               sclera non-icteric   
   
                                        NECK/THYROID:       no cervical lymphade  
nopathy, neck supple   
   
                                        HEART:              S1, S2 normal   
   
                                        LUNGS:              clear to auscultatio  
n bilaterally   
   
                                        ABDOMEN:            normal bowel sounds,  
 no guarding or rigidity, no hepatosplenomegaly, no  
masses palpable, soft, nontender,   
nondistended.   
   
                                        NEUROLOGIC:         alert and oriented   
   
                                        SKIN:               nonjaundiced, no spi  
barbara angiomata.   
   
                                        EXTREMITIES:        no edema   
   
                                        ORAL CAVITY:        mucosa moist

## 2024-12-26 NOTE — XMS_ITS
Patient Health Record  
  
                          Created on: 2024  
  
  
John Stewart  
External Reference #: [id number]  
: 1960  
Sex: Male  
  
Demographics  
  
  
                                        Address             [address] [place]  [address]  
   
                                        Work Phone          [phone]  
   
                                        Home Phone          [phone]  
   
                                        Mobile Phone        [phone]  
   
                                        Email Address       [email or web address]  
   
                                        Preferred Language  en  
   
                                        Marital Status        
   
                                        Confucianist Affiliation Unknown  
   
                                        Race                White  
   
                                        Ethnic Group        Not  or Lati  
no  
  
  
Author  
  
  
                                        Organization        Sulaiman Mercer MD  
   
                                        Address             [address]  
[address]  
[place], [place]  [id number]  
   
                                        Phone               [phone]  
  
  
Support  
  
  
                          Name         Relationship Address      Phone  
   
                          MINOO Stewart  Emergency Contact Unknown      [phone]  
[phone]  
   
                          John Stewart Guarantor    Unknown      [phone]  
  
  
Care Team Providers  
  
  
                                Care Team Member Name Role            Phone  
   
                                Sulaiman Mercer Primary Care Provider [phone]  
[phone]  
  
  
  
ALLERGIES  
No Known Allergies  
  
RESULTS  
  
  
                          Component    Value        Reference Range Notes  
   
                                                    Complete Blood Count Auto Di  
ff  
Reviewed date:2024 04:43:11 PM  
Interpretation:  
Performing Lab:[facility], [address] [place] [phone]  
Notes/Report:   
   
                          White Blood Count 6.4          4.8-10.8 X10*3/uL   
   
                          Red Blood Count 4.80         4.60-5.80 X10*6/uL   
   
                          Hemoglobin   15.3         14.0-18.0 g/dl   
   
                          Hematocrit   46.1         42.0-52.0 %    
   
                          Mean Corpuscular Volume 96.0         80.0-98.0 fL   
   
                          Mean Corpuscular Hemoglobin 31.9         27.0-33.0 pg   
   
                          Mean Corpuscular HGB Conc 33.2         31.0-36.0 g/dl   
   
                          Red Cell Distribution Width 12.4         11.0-16.0 %    
   
                          Platelet Count 297          160-400 X10*3/uL   
   
                          Mean Platelet Volume 10.5         9.4-12.4 fL    
   
                          Neutrophils Percent Auto 43.0         45-73 %        
   
                          Imm Gran Pct Auto 0.2          0.0-0.4 %      
   
                          Lymphocytes Percent Auto 45.3         20-40 %        
   
                          Monocytes Percent Auto 9.1          2-11 %         
   
                          Eosinophils Percent Auto 1.9          0-4 %          
   
                          Basophils Percent Auto 0.5          0-2 %          
   
                          NRBC Pct Auto 0.0          0.0-0.2 /100WBC   
   
                          Neutrophils Absolute Auto 2.7          2.0-8.3 x10*3/u  
L   
   
                          Imm Gran Abs Auto 0.01         0.00-0.03 X10*3/uL   
   
                          Lymphocytes Absolute Auto 2.9          1.2-4.9 X10*3/u  
L   
   
                          Monocytes Absolute Auto 0.6          0.1-1.2 X10*3/uL   
   
                          Eosinophils Absolute Auto 0.1          0.0-0.4 X10*3/u  
L   
   
                          Basophils Absolute Auto 0.0          0.0-0.2 X10*3/uL   
   
                          NRBC Abs Auto 0.000        0.0-0.012 X10*3/uL   
   
                                                    Comprehensive [place]. Panel Fa  
st  
Reviewed date:2024 04:42:33 PM  
Interpretation:  
Performing Lab:[facility], [address] [place] [phone]  
Notes/Report:   
   
                          Sodium       142          135-145 mmol/L   
   
                          Potassium    4.3          3.3-5.1 mmol/L   
   
                          Chloride     106           mmol/L   
   
                          Carbon Dioxide 28           22-29 mmol/L   
   
                          Anion Gap    12           12-20          
   
                          Blood Urea Nitrogen 20           9-16 mg/dL     
   
                          Creatinine   1.23         0.5-1.4 mg/dL   
   
                          Estimated Glomerular Filt Rate 59                       
     
  
  
Chronic Kidney Disease:   
Estimated GFR < 60   
mL/min/1.73m2  
  
  
Severe Kidney Disease:   
Estimated GFR < 15   
mL/min/1.73m2  
   
                          Glucose Fasting 108          60-99 mg/dL    
  
  
A fasting glucose from   
100-125 mg/dl is   
considered impaired  
  
  
(pre-diabetes).  
   
                          Calcium      9.5          8.4-10.2 mg/dL   
   
                          Bilirubin Total 0.7          0.0-1.0 mg/dL   
   
                          Aspartate Amino Transferase 45           5-37 U/L       
   
                          Alanine Aminotransferase 56           0-40 U/L       
   
                          Total Protein 7.3          6.5-8.0 g/dL   
   
                          Albumin Level 4.2          3.5-5.0 g/dL   
   
                          Alkaline Phosphatase 63            U/L     
   
                                                    Lipid Panel  
Reviewed date:2024 04:43:23 PM  
Interpretation:  
Performing Lab:[facility], [address] [place] [phone]  
Notes/Report:   
   
                          Triglycerides 488          <150 mg/dL     
  
  
Desirable Triglyceride:   
less than 150 mg/dL  
  
  
Borderline High   
Triglyceride 150-199 mg/dL  
  
  
High Triglyceride: 200-499   
mg/dL  
  
  
Very High Triglyceride:   
greater than or equal to  
  
  
5OO mg/dL  
   
                          Cholesterol  213          <200 mg/dL     
  
  
Desirable Cholesterol:   
less than 200 mg/dL  
  
  
Borderline High   
Cholesterol: 200-239 mg/dL  
  
  
High Cholesterol: greater   
than 239 mg/dL  
   
                          LDL Cholesterol Calculated TNP          <100 mg/dL     
  
  
Unable to calculate the   
LDL. The formula of   
Friedwald,  
  
  
Root, and Roger is   
only valid if the   
triglycerides are  
  
  
less than 400 mg/dl.  
   
                          HDL Cholesterol 32           >40 mg/dL      
  
  
Desirable HDL: greater   
than 40 mg/dL  
  
  
Note: This HDL assay may   
give artificially  
  
  
low results in patients   
with liver disease.  
   
                                                    PSA,Total (Free>4and<10)  
Reviewed date:2024 04:42:40 PM  
Interpretation:  
Performing Lab:[address] [phone]  
Notes/Report:   
   
                          PSA,Total (Free>4and<10) 1.32         0.00-4.00 ng/mL   
  
  
A Free PSA was not   
performed:  
  
  
The percentage of Free PSA   
can be used to enhance the  
  
  
differentiation of   
prostate cancer from   
benign prostatic  
  
  
disease in subjects whose   
PSA levels are between 4.0   
and  
  
  
10.0 ng/mL. For subjects   
whose PSA levels are below   
4.0 or  
  
  
above 10.0 ng/mL, the risk   
of prostate cancer is   
determined  
  
  
on the basis of the PSA   
alone. Therefore the %   
Free PSA  
  
  
is recommended only for   
those subjects whose PSA   
levels  
  
  
are between 4.0 and 10.0   
ng/mL.  
  
  
PSA methodology: Abbott   
Alinity i Chemiluminescent  
  
  
Microparticle Immunoassay   
(CMIA)  
   
                                                    Microalbumin, Random  
Reviewed date:2024 04:42:47 PM  
Interpretation:  
Performing Lab:[facility], [address] [place] [phone]  
Notes/Report:   
   
                          Creatinine Urine 101.61                      
   
                          Microalbumin Urine 5.0                         
   
                          Microalbum/Creatinine Ratio Ur 4.9          <30 ug/mg   
cr   
  
  
Albumin/Creatinine Ratio   
Reference Ranges:  
  
  
Normal: < 30 ug/mg   
creatinine  
  
  
Microalbuminuria: 30 - 300   
ug/mg creatinine  
  
  
Clinical Albuminuria: >   
300 ug/mg creatinine  
   
                                                    Hemoglobin A1c  
Reviewed date:2024 04:42:24 PM  
Interpretation:  
Performing Lab:[facility], [address] [place] [phone]  
Notes/Report:   
   
                          Hemoglobin A1c % 5.6          <6.0 %         
  
  
Hemoglobin A1C Reference   
Range  
  
  
Adults: 4.8 - 6.0 %  
  
  
Non diabetic: < 6.0 %  
  
  
Goal: < 7.0 %  
  
  
Additional Action   
Suggested: > 8.0 %  
  
  
Note: Hemoglobin A1c   
results are invalid for   
patients  
  
  
with abnormal amounts of   
HbF. Blood transfusions  
  
  
may impact the HbA1c   
concentration in the   
patient  
  
  
sample.  
   
                          Estimated Average Glucose 114                         
  
  
eAG =  Estimated average   
glucose  which is %A1C   
expressed as  
  
  
average glucose, using the   
formula of the A1C-Derived  
  
  
Average Glucose study   
(ADAG), Diabetes Care,   
Vol.31,#8,  
  
  
Aug.   
   
                                                    UA ClnCatch+Micro w/rflx Cul  
t  
Reviewed date:2024 04:43:46 PM  
Interpretation:  
Performing Lab:[facility], [address] [place] [phone]  
Notes/Report:  
Urine, Clean Catch   
   
                          Color Urine  Yellow                      
   
                          Appearance Urine Clear                       
   
                          PH           5.5          5.0-9.0        
   
                          Glucose Urine UA Negative     Negative mg/dL   
   
                          Urine Blood  Negative     Negative       
   
                          Specific Gravity - Urine 1.015        1.005-1.025    
   
                          Urine Protein Negative     Neg-Trace mg/dL   
   
                          Urine Ketones Negative     Negative mg/dL   
   
                          Nitrite Urine Negative     Negative       
   
                          Leukocyte Esterase Urine Negative     Negative       
   
                          RBC Urine    0-2          0-2 /HPF       
   
                          WBC Urine    0-5          0-5 /HPF       
   
                          Squamous Epithelial Cell Urine 0-2          0-2 /HPF    
     
   
                          Bacteria Urine None Seen    None Seen      
   
                          Hyaline Casts Urine 0-2          0-2 /LPF       
  
  
  
REASON FOR REFERRAL  
No Information  
  
MEDICATIONS  
  
  
                                        Medication          SIG (Take, Route,   
Frequency, Duration) Notes           Start Date      End Date        Status  
   
                                        Cyclobenzaprine HCl 5 MG 1 tablet as nee  
ded   
Orally Three times a   
day for 10 days                 2019                      Not-Taking  
   
                                                    HYDROcodone-Acetaminophen   
5-325 MG                                1 tablet as needed   
Orally every 6 hrs   
for 5 days                      12/10/2021                      Not-Taking  
   
                                                    Ventolin HFA * 108 (90   
Base) MCG/ACT                           2 puffs as needed   
Inhalation every 4   
hrs for 30 day(s)                 2018                      Not-Taking  
   
                                        Valsartan 80 MG     TAKE 1 TABLET BY   
MOUTH EVERY DAY                                                 Active  
   
                                        Scopolamine 1 MG/3DAYS APPLY 1 PATCH TO   
THE   
SKIN EVERY 3 DAYS   
EVERY 3 DAYS AS   
NEEDED 12 for 12                                                 Active  
   
                                        LORazepam 1 MG      1 tablet at bedtime   
as needed Orally Once   
a day for 4 days                 12/10/2021                      Not-Taking  
   
                                                    Paxlovid (300/100) 20 x 150   
MG & 10 x 100MG                         3 tablets Orally   
Twice a day for 5   
day(s)                          2024                      Active  
  
  
  
IMMUNIZATIONS  
  
  
                      Vaccine    Route      Administration Date Status     Comme  
nts  
   
                      Flu Vaccine Unknown    10/22/2013 Administered Given at wo  
rk;   
[facility]  
   
                      Flu Vaccine Unknown    10[facility]07/2014 [facility]  
   
                      Fluarix Quadrivalent Unknown    2016 Administered [facility]  
   
                      Shingrix   IM Intramuscular 2018 Administered   
   
                      Shingrix   IM Intramuscular 2019 Administered   
   
                      Fluarix Quadrivalent Unknown    10/11/2020 Administered [facility]  
S  
   
                      SARS-COV-2 Pfizer Unknown    2021 Administered   
   
                      SARS-COV-2 Pfizer Unknown    2021 Administered   
   
                      SARS-COV-2 Pfizer Unknown    10/06/2021 Administered   
   
                      Fluarix Quadrivalent Unknown    10/06/2021 Administered   
   
                      SARS-COV-2 Pfizer Unknown    2022 Administered [facility]  
   
                      Fluarix Quadrivalent Unknown    2022 Administered [facility]  
S  
   
                      SARS-COV-2 Pfizer Unknown    2023 Administered   
  
  
  
SOCIAL HISTORY  
Tobacco Use:  
  
                                Social History Observation Description     Date  
   
                                Details (start date - stop date) Never Smoker     
 NA - NA  
  
Sex Assigned At Birth:  
  
                                        Social History Observation Description  
   
                                        Sex Assigned At Birth Unknown  
  
Tobacco Use/Smoking  
  
                                Question        Answer          Notes  
   
                                Patient is a    nonsmoker         
   
                                        Additional Findings: Tobacco Non-User Cu  
rrent non-smoker, currently using no   
form of tobacco                           
  
  
  
PROBLEMS  
  
  
                                                    Problem   
Type                      ICD Code                  Onset   
Dates                                   Problem   
Status          W/U Status      Risk            SNOMED Code     Notes  
   
                                        Problem             Lumbar disc   
disease (M51.9)            Active     confirmed             742258193    
   
                                        Problem             Essential   
hypertension   
(I10)                     Active       confirmed                 Essential   
hypertension   
(60471651)                                
   
                                        Problem             Prediabetes   
(R73.09)                  Active       confirmed                 Prediabetes   
(823073194)                               
   
                                        Problem             High   
triglycerides   
(E78.1)               Active     confirmed             794480160    
   
                                        Problem             Cervical disc   
disease (M50.90)            Active     confirmed             055309442    
   
                                        Problem             Sciatica of left   
side (M54.32)            Active     confirmed             03128915     
   
                                        Problem             Heberden's node   
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    
   
                                        Problem             Renal cyst   
(N28.1)               Active     confirmed             129713588    
   
                                        Problem             Kidney cyst,   
acquired (N28.1)            Active     confirmed             081428938    
   
                                        Problem             Adenomatous   
rectal polyp   
(D12.8)               Active     confirmed             [id number]   
   
                                        Problem             Skin melanoma   
(C43.9)               Active     confirmed             61217390     
  
  
  
VITAL SIGNS  
  
  
                          Blood pressure diastolic 70 mm Hg     2024     
   
                          Height       71 in        2024   weight is 205 a  
t home BP not taken no   
temp  
   
                          Blood pressure systolic 116 mm Hg    2024     
   
                          Weight       205 lbs      2024   weight is 205 a  
t home BP not taken no   
temp  
   
                          BMI          28.59 kg/m2  2024   weight is 205 a  
t home BP not taken no   
temp  
  
  
  
Encounters  
  
  
                      Encounter  Location   Date       Provider   Diagnosis  
   
                                                    Sulaiman Mercer MD                                      [address]   
[address] [place] [phone]        2024          Sulaiman Mercer     Blood tests for   
routine general   
physical examination   
Z00.00 ; High   
triglycerides E78.1 ;   
Prediabetes R73.09   
and Essential   
hypertension I10  
   
                                                    Sulaiman Mercer MD                                      [address] [place] [address] Drive   
[address] [place] [phone]        2024          Sulaiman Mercer     Essential   
hypertension I10 and   
Skin melanoma C43.9  
   
                                                    Sulaiman Mercer MD                                      [address] [place] [address] Drive   
[address] [phone]        2024          Sulaiman Mercer MD                                      [address] [place] [id number]        2024          Sulaiman Mercer MD                                      [address] [place] [address] Drive   
[address] [phone]        2024          Sulaiman Mercer     COVID-19 U07.1  
  
  
  
ASSESSMENTS  
  
  
                      Encounter Date Diagnosis  Assessment Notes Treatment Notes  
 Treatment   
Clinical Notes  
   
                                        2024          High triglycerides   
(ICD-10 - E78.1)                                              
   
                                        2024          Blood tests for   
routine general   
physical examination   
(ICD-10 - Z00.00)                                             
   
                                        2024          Essential   
hypertension (ICD-10   
- I10)                                              doing great on   
med, will   
continue current   
regiment                                  
   
                                        2024          Skin melanoma (ICD-1  
0   
- C43.9)                                            need note from   
[facility] sarah   
derm/ REQUEST   
MADE BY PHONE   
MESSAGE TO FAX   
THE OFFICE NOTES   
TO PCP                                    
   
                                        2024          COVID-19 (ICD-10 -   
U07.1)                                                        
   
                                        2024          Prediabetes (ICD-10   
-   
R73.09)                                                       
   
                                        2024          Essential   
hypertension (ICD-10   
- I10)                                                        
  
  
  
PLAN OF TREATMENT  
Pending Test  
  
                                        Test Name           Order Date  
   
                                        Electrocardiogram (EKG) 2019  
   
                                        Electrocardiogram (EKG) 2013  
   
                                        Electrocardiogram (EKG) 10/06/2017  
   
                                        Electrocardiogram (EKG) 2018  
   
                                        CARDIOVASCULAR STRESS TEST 12/10/2021  
   
                                        CT ABD & PELVIS WITH CONTRAST 2017  
  
Next Appt  
  
                                                    Details  
   
                                                    Provider Name:Sulaiman LUI John  
ier, 2024 09:30:00 AM, [address],   
[address], [place], [place], [id number], [phone]  
  
  
  
Insurance Providers  
  
  
                                                    Payer   
Name                                    Payer   
Address                                 Payer   
Phone                                   Subscriber   
Number                                  Group   
Number                                  Insured   
Name                                    Patient   
Relationship   
to Insured                              Coverage   
Start   
Date                                    Coverage   
End Date  
   
                                                    BLUE   
CROSS   
AND BLUE   
SHIELD                                  [address]   
[place], [place]   
[id number]                               [phone]               [id number]                            John Stewart                                    Self -   
patient is   
the insured                                           
  
  
  
MEDICAL (GENERAL) HISTORY  
Medical History  
  
                                        History             ICD Code  
   
                                                    colonoscopy 14, Dr. Jaret goel - 5 year f/u; Colonoscopy done 10/18/19 by   
Dr. Prado - repeat 5 years                
   
                                        ultrasound kidneys need no further follo  
w up

## 2024-12-30 ENCOUNTER — HOSPITAL ENCOUNTER (OUTPATIENT)
Age: 64
Discharge: HOME | End: 2024-12-30
Payer: MEDICARE

## 2024-12-30 VITALS
DIASTOLIC BLOOD PRESSURE: 61 MMHG | RESPIRATION RATE: 18 BRPM | SYSTOLIC BLOOD PRESSURE: 97 MMHG | OXYGEN SATURATION: 93 % | TEMPERATURE: 97.2 F | HEART RATE: 79 BPM

## 2024-12-30 VITALS
RESPIRATION RATE: 16 BRPM | DIASTOLIC BLOOD PRESSURE: 88 MMHG | HEART RATE: 102 BPM | SYSTOLIC BLOOD PRESSURE: 118 MMHG | TEMPERATURE: 97.88 F | OXYGEN SATURATION: 95 %

## 2024-12-30 VITALS
RESPIRATION RATE: 18 BRPM | DIASTOLIC BLOOD PRESSURE: 70 MMHG | OXYGEN SATURATION: 94 % | SYSTOLIC BLOOD PRESSURE: 105 MMHG | HEART RATE: 79 BPM | TEMPERATURE: 97.16 F

## 2024-12-30 VITALS
HEART RATE: 77 BPM | TEMPERATURE: 96.98 F | DIASTOLIC BLOOD PRESSURE: 54 MMHG | SYSTOLIC BLOOD PRESSURE: 90 MMHG | OXYGEN SATURATION: 97 % | RESPIRATION RATE: 18 BRPM

## 2024-12-30 VITALS — BODY MASS INDEX: 29.8 KG/M2 | BODY MASS INDEX: 29.6 KG/M2

## 2024-12-30 DIAGNOSIS — Z85.820: ICD-10-CM

## 2024-12-30 DIAGNOSIS — K21.9: ICD-10-CM

## 2024-12-30 DIAGNOSIS — K58.9: ICD-10-CM

## 2024-12-30 DIAGNOSIS — Z98.890: ICD-10-CM

## 2024-12-30 DIAGNOSIS — I10: ICD-10-CM

## 2024-12-30 DIAGNOSIS — K57.30: ICD-10-CM

## 2024-12-30 DIAGNOSIS — Z86.0101: ICD-10-CM

## 2024-12-30 DIAGNOSIS — K64.8: ICD-10-CM

## 2024-12-30 DIAGNOSIS — K63.5: ICD-10-CM

## 2024-12-30 DIAGNOSIS — Z12.11: Primary | ICD-10-CM

## 2024-12-30 DIAGNOSIS — Z79.899: ICD-10-CM

## 2024-12-30 DIAGNOSIS — K44.9: ICD-10-CM

## 2024-12-30 DIAGNOSIS — K20.80: ICD-10-CM

## 2024-12-30 DIAGNOSIS — Z87.11: ICD-10-CM

## 2024-12-30 PROCEDURE — 43239 EGD BIOPSY SINGLE/MULTIPLE: CPT

## 2024-12-30 PROCEDURE — 88305 TISSUE EXAM BY PATHOLOGIST: CPT

## 2024-12-30 PROCEDURE — 45380 COLONOSCOPY AND BIOPSY: CPT

## 2024-12-30 PROCEDURE — 88313 SPECIAL STAINS GROUP 2: CPT

## 2024-12-30 NOTE — XMS_ITS
Progress note - 2024  
  
                          Created on: 2024  
  
  
NISREEN GANDHI  
External Reference #: [id number]  
: 1960  
Sex: Male  
  
Demographics  
  
  
                                        Address             [address] [place]  [address]  
   
                                        Home Phone          [phone]  
   
                                        Mobile Phone        [phone]  
   
                                        Email Address       [email or web address]  
   
                                        Preferred Language  en  
   
                                        Marital Status        
   
                                        Faith Affiliation Unknown  
   
                                        Race                White  
   
                                        Ethnic Group        Not  or Lati  
no  
  
  
Author  
  
  
                                        Organization        [facility]  
   
                                        Address             [address]  
[address]  
[place]  [address]  
   
                                        Phone               [phone]  
  
  
Support  
  
  
                          Name         Relationship Address      Phone  
   
                                OKSANA NICOLAS   Emergency Contact [address] [place]  [id number] [phone]  
   
                          NISREEN GANDHI Guarantor    Unknown      [phone]  
  
  
Care Team Providers  
  
  
                                Care Team Member Name Role            Phone  
   
                                Sulaiman Mercer MD Primary Care Provider John Bennett     [phone]  
  
  
  
ALLERGIES  
No Known Allergies  
  
REASON FOR VISIT  
Patient presents today for a SCREENING COLON  
  
MEDICATIONS  
  
  
                                        Medication          SIG (Take, Route, Fr  
equency,   
Duration)       Notes           Start Date      End Date        Status  
   
                                        Valsartan 80 MG     TAKE 1 TABLET BY LARRY  
TH EVERY DAY   
Oral for 90                                                     Active  
   
                    Multivitamin Adults                                           
Active  
  
  
  
PROBLEMS  
  
  
                                                    Problem   
Type                      ICD Code                  Onset   
Dates                                   Problem   
Status          W/U Status      Risk            SNOMED Code     Notes  
   
                                        Problem             Colon cancer   
screening   
(Z12.11)                  Active       confirmed                 Colon cancer   
screening   
(349362190)                               
   
                                        Problem             Chronic GERD   
(K21.9)                   Active       confirmed                 Gastroesophagea  
l   
reflux disease   
(disorder)   
(552243633)                               
  
  
  
VITAL SIGNS  
  
  
                                BMI             29.86 kg/m2     2024  
   
                                Blood pressure systolic 000 mm Hg       20  
24  
   
                                Blood pressure diastolic 00 mm Hg          
024  
   
                                Height          71 in           2024  
   
                                Temperature     97.8 degrees Fahrenheit 20  
24  
   
                                Weight          214 lb 2 oz lbs 2024  
  
  
  
Encounters  
  
  
                      Encounter  Location   Date       Provider   Diagnosis  
   
                                                    [facility]   
[facility]                          [address]   
[address] [place] [phone]       2024          John Prado        Colon cancer screeni  
ng   
Z12.11 ; Chronic GERD   
K21.9 and Hx of   
adenomatous colonic   
polyps Z86.010  
  
  
  
ASSESSMENTS  
  
  
                      Encounter Date Diagnosis  Assessment Notes Treatment Notes  
 Treatment   
Clinical Notes  
   
                                        2024          Colon cancer   
screening (ICD-10 -   
Z12.11)                                                       
   
                                        2024          Chronic GERD (ICD-10  
   
- K21.9)                                                      
   
                                        2024          Hx of adenomatous   
colonic polyps   
(ICD-10 - Z86.010)                                            
  
  
  
PLAN OF TREATMENT  
Future Test  
  
                                        Test Name           Order Date  
   
                                        UPPER GI ENDOSCOPY  2024  
   
                                        COLONOSCOPY         2024  
  
Next Appt  
  
                                                    Details  
   
                                                    Follow Up: prn, Reason:  
   
                                                    Provider Name:John Prado  
, 2024 08:40:00 AM, [address] [place] [address],   
[id number], [phone]  
  
  
  
Progress Notes  
*   
  
Examination  
  
  
  
                          Category     Sub-Category Detail       Notes  
   
                                General Examination GENERAL APPEARANCE: pleasant  
, well nourished, well   
developed, in   
no acute distress   
  
  
  
                                        EYES:               sclera non-icteric   
   
                                        NECK/THYROID:       no cervical lymphade  
nopathy, neck supple   
   
                                        HEART:              S1, S2 normal   
   
                                        LUNGS:              clear to auscultatio  
n bilaterally   
   
                                        ABDOMEN:            normal bowel sounds,  
 no guarding or rigidity, no hepatosplenomegaly, no  
masses palpable, soft, nontender,   
nondistended.   
   
                                        NEUROLOGIC:         alert and oriented   
   
                                        SKIN:               nonjaundiced, no spi  
barbara angiomata.   
   
                                        EXTREMITIES:        no edema   
   
                                        ORAL CAVITY:        mucosa moist

## 2024-12-30 NOTE — XMS_ITS
Progress note - 2024  
  
                          Created on: 2024  
  
  
John Stewart  
External Reference #: [id number]  
: 1960  
Sex: Male  
  
Demographics  
  
  
                                        Address             [address] [place]  [address]  
   
                                        Work Phone          [phone]  
   
                                        Home Phone          [phone]  
   
                                        Mobile Phone        [phone]  
   
                                        Email Address       [email or web address]  
   
                                        Preferred Language  en  
   
                                        Marital Status        
   
                                        Hoahaoism Affiliation Unknown  
   
                                        Race                White  
   
                                        Ethnic Group        Not  or Lati  
no  
  
  
Author  
  
  
                                        Organization        Sulaiman Mercer MD  
   
                                        Address             [address]  
[address]  
[place], [place]  [id number]  
   
                                        Phone               [phone]  
  
  
Support  
  
  
                          Name         Relationship Address      Phone  
   
                          MINOO Stewart  Emergency Contact Unknown      [phone]  
   
                          John Stewart Guarantor    Unknown      [phone]  
  
  
Care Team Providers  
  
  
                                Care Team Member Name Role            Phone  
   
                                Sulaiman Mercer Primary Care Provider [phone]  
  
  
  
RESULTS  
  
  
                          Component    Value        Reference Range Notes  
   
                                                    Complete Blood Count Auto Di  
ff  
Reviewed date:2024 04:43:11 PM  
Interpretation:  
Performing Lab:[facility], [address] [place] [phone]  
Notes/Report:   
   
                          White Blood Count 6.4          4.8-10.8 X10*3/uL   
   
                          Red Blood Count 4.80         4.60-5.80 X10*6/uL   
   
                          Hemoglobin   15.3         14.0-18.0 g/dl   
   
                          Hematocrit   46.1         42.0-52.0 %    
   
                          Mean Corpuscular Volume 96.0         80.0-98.0 fL   
   
                          Mean Corpuscular Hemoglobin 31.9         27.0-33.0 pg   
   
                          Mean Corpuscular HGB Conc 33.2         31.0-36.0 g/dl   
   
                          Red Cell Distribution Width 12.4         11.0-16.0 %    
   
                          Platelet Count 297          160-400 X10*3/uL   
   
                          Mean Platelet Volume 10.5         9.4-12.4 fL    
   
                          Neutrophils Percent Auto 43.0         45-73 %        
   
                          Imm Gran Pct Auto 0.2          0.0-0.4 %      
   
                          Lymphocytes Percent Auto 45.3         20-40 %        
   
                          Monocytes Percent Auto 9.1          2-11 %         
   
                          Eosinophils Percent Auto 1.9          0-4 %          
   
                          Basophils Percent Auto 0.5          0-2 %          
   
                          NRBC Pct Auto 0.0          0.0-0.2 /100WBC   
   
                          Neutrophils Absolute Auto 2.7          2.0-8.3 x10*3/u  
L   
   
                          Imm Gran Abs Auto 0.01         0.00-0.03 X10*3/uL   
   
                          Lymphocytes Absolute Auto 2.9          1.2-4.9 X10*3/u  
L   
   
                          Monocytes Absolute Auto 0.6          0.1-1.2 X10*3/uL   
   
                          Eosinophils Absolute Auto 0.1          0.0-0.4 X10*3/u  
L   
   
                          Basophils Absolute Auto 0.0          0.0-0.2 X10*3/uL   
   
                          NRBC Abs Auto 0.000        0.0-0.012 X10*3/uL   
   
                                                    Comprehensive [place]. Panel Fa  
st  
Reviewed date:2024 04:42:33 PM  
Interpretation:  
Performing Lab:[facility], [address] [place] [phone]  
Notes/Report:   
   
                          Sodium       142          135-145 mmol/L   
   
                          Potassium    4.3          3.3-5.1 mmol/L   
   
                          Chloride     106           mmol/L   
   
                          Carbon Dioxide 28           22-29 mmol/L   
   
                          Anion Gap    12           12-20          
   
                          Blood Urea Nitrogen 20           9-16 mg/dL     
   
                          Creatinine   1.23         0.5-1.4 mg/dL   
   
                          Estimated Glomerular Filt Rate 59                       
     
  
  
Chronic Kidney Disease:   
Estimated GFR < 60   
mL/min/1.73m2  
  
  
Severe Kidney Disease:   
Estimated GFR < 15   
mL/min/1.73m2  
   
                          Glucose Fasting 108          60-99 mg/dL    
  
  
A fasting glucose from   
100-125 mg/dl is   
considered impaired  
  
  
(pre-diabetes).  
   
                          Calcium      9.5          8.4-10.2 mg/dL   
   
                          Bilirubin Total 0.7          0.0-1.0 mg/dL   
   
                          Aspartate Amino Transferase 45           5-37 U/L       
   
                          Alanine Aminotransferase 56           0-40 U/L       
   
                          Total Protein 7.3          6.5-8.0 g/dL   
   
                          Albumin Level 4.2          3.5-5.0 g/dL   
   
                          Alkaline Phosphatase 63            U/L     
   
                                                    Lipid Panel  
Reviewed date:2024 04:43:23 PM  
Interpretation:  
Performing Lab:[facility], [address] [place] [phone]  
Notes/Report:   
   
                          Triglycerides 488          <150 mg/dL     
  
  
Desirable Triglyceride:   
less than 150 mg/dL  
  
  
Borderline High   
Triglyceride 150-199 mg/dL  
  
  
High Triglyceride: 200-499   
mg/dL  
  
  
Very High Triglyceride:   
greater than or equal to  
  
  
5OO mg/dL  
   
                          Cholesterol  213          <200 mg/dL     
  
  
Desirable Cholesterol:   
less than 200 mg/dL  
  
  
Borderline High   
Cholesterol: 200-239 mg/dL  
  
  
High Cholesterol: greater   
than 239 mg/dL  
   
                          LDL Cholesterol Calculated TNP          <100 mg/dL     
  
  
Unable to calculate the   
LDL. The formula of   
Friedwald,  
  
  
Root, and Roger is   
only valid if the   
triglycerides are  
  
  
less than 400 mg/dl.  
   
                          HDL Cholesterol 32           >40 mg/dL      
  
  
Desirable HDL: greater   
than 40 mg/dL  
  
  
Note: This HDL assay may   
give artificially  
  
  
low results in patients   
with liver disease.  
   
                                                    PSA,Total (Free>4and<10)  
Reviewed date:2024 04:42:40 PM  
Interpretation:  
Performing Lab:[address] [phone]  
Notes/Report:   
   
                          PSA,Total (Free>4and<10) 1.32         0.00-4.00 ng/mL   
  
  
A Free PSA was not   
performed:  
  
  
The percentage of Free PSA   
can be used to enhance the  
  
  
differentiation of   
prostate cancer from   
benign prostatic  
  
  
disease in subjects whose   
PSA levels are between 4.0   
and  
  
  
10.0 ng/mL. For subjects   
whose PSA levels are below   
4.0 or  
  
  
above 10.0 ng/mL, the risk   
of prostate cancer is   
determined  
  
  
on the basis of the PSA   
alone. Therefore the %   
Free PSA  
  
  
is recommended only for   
those subjects whose PSA   
levels  
  
  
are between 4.0 and 10.0   
ng/mL.  
  
  
PSA methodology: Abbott   
Alinity i Chemiluminescent  
  
  
Microparticle Immunoassay   
(CMIA)  
   
                                                    Microalbumin, Random  
Reviewed date:2024 04:42:47 PM  
Interpretation:  
Performing Lab:[facility], [address] [place] [phone]  
Notes/Report:   
   
                          Creatinine Urine 101.61                      
   
                          Microalbumin Urine 5.0                         
   
                          Microalbum/Creatinine Ratio Ur 4.9          <30 ug/mg   
cr   
  
  
Albumin/Creatinine Ratio   
Reference Ranges:  
  
  
Normal: < 30 ug/mg   
creatinine  
  
  
Microalbuminuria: 30 - 300   
ug/mg creatinine  
  
  
Clinical Albuminuria: >   
300 ug/mg creatinine  
   
                                                    Hemoglobin A1c  
Reviewed date:2024 04:42:24 PM  
Interpretation:  
Performing Lab:[facility], [address] [place] [phone]  
Notes/Report:   
   
                          Hemoglobin A1c % 5.6          <6.0 %         
  
  
Hemoglobin A1C Reference   
Range  
  
  
Adults: 4.8 - 6.0 %  
  
  
Non diabetic: < 6.0 %  
  
  
Goal: < 7.0 %  
  
  
Additional Action   
Suggested: > 8.0 %  
  
  
Note: Hemoglobin A1c   
results are invalid for   
patients  
  
  
with abnormal amounts of   
HbF. Blood transfusions  
  
  
may impact the HbA1c   
concentration in the   
patient  
  
  
sample.  
   
                          Estimated Average Glucose 114                         
  
  
eAG =  Estimated average   
glucose  which is %A1C   
expressed as  
  
  
average glucose, using the   
formula of the A1C-Derived  
  
  
Average Glucose study   
(ADAG), Diabetes Care,   
Vol.31,#8,  
  
  
Aug. 2008  
   
                                                    UA ClnCatch+Micro w/rflx Cul  
t  
Reviewed date:2024 04:43:46 PM  
Interpretation:  
Performing Lab:[facility], [address] [place] [phone]  
Notes/Report:  
Urine, Clean Catch   
   
                          Color Urine  Yellow                      
   
                          Appearance Urine Clear                       
   
                          PH           5.5          5.0-9.0        
   
                          Glucose Urine UA Negative     Negative mg/dL   
   
                          Urine Blood  Negative     Negative       
   
                          Specific Gravity - Urine 1.015        1.005-1.025    
   
                          Urine Protein Negative     Neg-Trace mg/dL   
   
                          Urine Ketones Negative     Negative mg/dL   
   
                          Nitrite Urine Negative     Negative       
   
                          Leukocyte Esterase Urine Negative     Negative       
   
                          RBC Urine    0-2          0-2 /HPF       
   
                          WBC Urine    0-5          0-5 /HPF       
   
                          Squamous Epithelial Cell Urine 0-2          0-2 /HPF    
     
   
                          Bacteria Urine None Seen    None Seen      
   
                          Hyaline Casts Urine 0-2          0-2 /LPF       
  
  
  
REASON FOR VISIT  
FASTING LABS  
  
Encounters  
  
  
                      Encounter  Location   Date       Provider   Diagnosis  
   
                                                    Sulaiman Mercer MD                                      [address] [place] [address]   
[address] [place] [phone]        2024          Sulaiman Mercer     Blood tests for   
routine general   
physical examination   
Z00.00 ; High   
triglycerides E78.1 ;   
Prediabetes R73.09   
and Essential   
hypertension I10  
  
  
  
ASSESSMENTS  
  
  
                      Encounter Date Diagnosis  Assessment Notes Treatment Notes  
 Treatment   
Clinical Notes  
   
                                        2024          Blood tests for   
routine general   
physical examination   
(ICD-10 - Z00.00)                                             
   
                                        2024          High triglycerides   
(ICD-10 - E78.1)                                              
   
                                        2024          Prediabetes (ICD-10   
-   
R73.09)                                                       
   
                                        2024          Essential   
hypertension (ICD-10   
- I10)                                                        
  
  
  
PLAN OF TREATMENT  
Next Appt  
  
                                                    Details  
   
                                                    Provider Name:Sulaiman campos, 2025 07:15:00 AM, [address] [place] [address],   
[address], [place], [place], [id number], [phone]  
   
                                                    Provider Name:Sulaiman campos, 2026 10:30:00 AM, [address] [place] [address] Drive,   
[address], [place], [id number], [phone]

## 2024-12-30 NOTE — XMS_ITS
Patient Health Record  
  
                          Created on: 2024  
  
  
NISREEN GANDHI  
External Reference #: [id number]  
: 1960  
Sex: Male  
  
Demographics  
  
  
                                        Address             [address] [place]  [address]  
   
                                        Home Phone          [phone]  
   
                                        Mobile Phone        [phone]  
   
                                        Email Address       [email or web address]  
   
                                        Preferred Language  en  
   
                                        Marital Status        
   
                                        Hinduism Affiliation Unknown  
   
                                        Race                White  
   
                                        Ethnic Group        Not  or Lati  
no  
  
  
Author  
  
  
                                        Organization        [facility]  
   
                                        Address             [address]  
[address]  
[place]  [address]  
   
                                        Phone               [phone]  
  
  
Support  
  
  
                          Name         Relationship Address      Phone  
   
                                NICOLAS GANDHI   Emergency Contact [address] [place]  [id number] [phone]  
   
                          NISREEN GANDHI Guarantor    Unknown      [phone]  
  
  
Care Team Providers  
  
  
                                Care Team Member Name Role            Phone  
   
                                Sylvie SEVERINO, Sulaiman Primary Care Provider John Bennett   Unavailable     [phone]  
  
  
  
ALLERGIES  
No Known Allergies  
  
REASON FOR REFERRAL  
No Information  
  
MEDICATIONS  
  
  
                                        Medication          SIG (Take, Route, Fr  
equency,   
Duration)       Notes           Start Date      End Date        Status  
   
                                        Valsartan 80 MG     TAKE 1 TABLET BY LARRY  
TH EVERY DAY   
Oral for 90                                                     Active  
   
                    Multivitamin Adults                                           
Active  
  
  
  
IMMUNIZATIONS  
  
  
                      Vaccine    Route      Administration Date Status     Comme  
nts  
   
                      Influenza  Unknown    10/01/2018 Administered   
   
                      Influenza  Unknown    2024 Administered   
  
  
  
SOCIAL HISTORY  
Sex Assigned At Birth:  
  
                                        Social History Observation Description  
   
                                        Sex Assigned At Birth Unknown  
  
  
  
PROBLEMS  
  
  
                                                    Problem   
Type                      ICD Code                  Onset   
Dates                                   Problem   
Status          W/U Status      Risk            SNOMED Code     Notes  
   
                                        Problem             Colon cancer   
screening   
(Z12.11)                  Active       confirmed                 Colon cancer   
screening   
(795186609)                               
   
                                        Problem             Encounter for   
screening for   
malignant   
neoplasm of   
colon (Z12.11)            Active     confirmed             [id number]    
   
                                        Problem             Hx of   
adenomatous   
colonic polyps   
(Z86.010)             Active     confirmed             [id number]    
   
                                        Problem             Abdominal pain,   
right upper   
quadrant   
(R10.11)              Active     confirmed             [id number]    
   
                                        Problem             Pre-procedural   
examination   
(Z01.818)             Active     confirmed             [id number]   
   
                                        Problem             Chronic GERD   
(K21.9)                   Active       confirmed                 Gastroesophagea  
l   
reflux disease   
(disorder)   
(904126727)                               
  
  
  
VITAL SIGNS  
  
  
                          Temperature  97.8 degrees Fahrenheit 2024     
   
                          Blood pressure diastolic 00 mm Hg     2024     
   
                          Height       71 in        2024     
   
                          Blood pressure systolic 000 mm Hg    2024     
   
                          Weight       214 lb 2 oz lbs 2024     
   
                          BMI          29.86 kg/m2  2024     
  
  
  
Encounters  
  
  
                      Encounter  Location   Date       Provider   Diagnosis  
   
                                        [facility]      [address] [place] [address]   
[id number]           2024          John Prado          
   
                                                    [facility]                                 [address]   
[address] [place] [address]       2024          John Prado        Colon cancer   
screening Z12.11 ;   
Chronic GERD K21.9   
and Hx of adenomatous   
colonic polyps   
Z86.010  
  
  
  
ASSESSMENTS  
  
  
                      Encounter Date Diagnosis  Assessment Notes Treatment Notes  
 Treatment   
Clinical Notes  
   
                                        2024          Colon cancer   
screening (ICD-10 -   
Z12.11)                                                       
   
                                        2024          Chronic GERD (ICD-10  
   
- K21.9)                                                      
   
                                        2024          Hx of adenomatous   
colonic polyps   
(ICD-10 - Z86.010)                                            
  
  
  
PLAN OF TREATMENT  
Future Test  
  
                                        Test Name           Order Date  
   
                                        COLONOSCOPY         2013  
   
                                        COLONOSCOPY         2019  
   
                                        UPPER GI ENDOSCOPY  2024  
   
                                        COLONOSCOPY         2024  
  
Next Appt  
  
                                                    Details  
   
                                                    Provider Name:John DURAN Eve  
, 2024 08:40:00 AM, [address] [place] [address] ,   
[place], [place],   
[id number], [phone]  
  
  
  
Insurance Providers  
  
  
                                                    Payer   
Name                                    Payer   
Address                                 Payer   
Phone                                   Subscriber   
Number                                  Group   
Number                                  Insured   
Name                                    Patient   
Relationship   
to Insured                              Coverage   
Start   
Date                                    Coverage   
End Date  
   
                                                    [facility] [id number]   
[place] [address]                                [phone]               [id number]                            NISREEN GANDHI                                    Self - patient   
is the insured                                        
  
  
  
MEDICAL (GENERAL) HISTORY  
Medical History  
  
                                        History             ICD Code  
   
                                                    Colonoscopy 3--- smal  
l tubular adenoma removed; also noted were small   
internal hemorrhoids and occasional diverticulosis; colonoscopy in   
2014-small tubular adenoma removed      
   
                                        IBS                   
   
                                        Hx of a bleeding ulcer which required tr  
ansfusions in high school.   
   
                                        Denies MI,DM,CVA,Lung disease,renal dise  
ase   
   
                                                    Chronic right upper quadrant  
 discomfort with a negative gallbladder   
ultrasound in                         
   
                                        Colonoscopy 10/2019 with a small tubular  
 adenoma   
   
                                        HTN                   
   
                                        Malignant melanoma on scalp    
  
Surgical History  
  
                                        Surgery             Date(Month/Year)  
   
                                        Left shoulder         
   
                                        Removal of malignant melanoma from scalp  
 in 2023

## 2024-12-30 NOTE — HO.ANESPROP2
[facility]
Past Medical History
Medical History (Reviewed 12/30/24 @ 09:00 by Latoya Roberts MD)

Bleeding ulcer
IBS (irritable bowel syndrome)
Tubular adenoma of colon
HTN (hypertension)
Malignant melanoma


Functional capacity: uses cane/walker
Surgical History
Surgical History (Reviewed 12/30/24 @ 09:00 by Latoya Roberts MD)

History of tonsillectomy
H/O colonoscopy
Hx of melanoma excision
Hx of shoulder surgery


History of Problems with Anesthesia: No
Social History
Social History (Reviewed 12/30/24 @ 09:00 by Latoya Roberts MD)
Patient Tobacco Use Status:  Never used Tobacco 
Use of substances other than those prescribed or required for medical reasons:  Yes 
Are you DNR?:  No 
Advance Directives:  No 
Advance Directives Information Provided:  Yes 
Recently lost weight without trying:  No 
Nutrition Risks:  No Nutritional Risk 
Poor oral hygiene:  No 



Meds
Allergies

Allergy/AdvReac Type Severity Reaction Status Date / Time
No Known Allergies Allergy   Verified 12/26/24 13:00


Home Medications

?Medication ?Instructions ?Recorded ?Confirmed ?Last Taken ?Type
multivitamin 1 tab PO DAILY 12/26/24 12/26/24 Unknown History
valsartan 80 mg tablet 80 mg PO DAILY 12/26/24 12/26/24 12/30/24 History



Exam
Height,Weight and Vital Signs: 


Height                         5 ft 11 in                                       
Weight                         97.069 kg                                        


Airway
Mallampati Class: III
TM Dist: >3cm
Neck ROM: Full
Loose/Missing/Broken Teeth: No
Heart: 
RRR
Lungs: 
CTA

Assessment and Plan
Assessment
Anesthesia Assessment: Anesthesia Plan Discussed and Chart Reviewed
Final Anesthetic Review
History of Problems with Anesthesia: No
NPO: Yes
ASA Class: II
Final Preanesthetic Review: Meds/Allgs Chart Reviewed, Consent Obtained/Reviewed and Anes Risks/Benef Reviewed
Patient Risk: Low
Procedure Risk: Intermediate
Anesthetic Plan
Anesthetic Plan: MAC:
Disposition: Standard PACU

## 2024-12-30 NOTE — P.CONAN_ITS
[facility]    
Past Medical History    
Medical History (Reviewed 12/30/24 @ 09:00 by Latoya Roberts MD)    
    
Bleeding ulcer    
IBS (irritable bowel syndrome)    
Tubular adenoma of colon    
HTN (hypertension)    
Malignant melanoma    
    
    
Functional capacity: uses cane/walker    
Surgical History    
Surgical History (Reviewed 12/30/24 @ 09:00 by Latoya Roberts MD)    
    
History of tonsillectomy    
H/O colonoscopy    
Hx of melanoma excision    
Hx of shoulder surgery    
    
    
History of Problems with Anesthesia: No    
Social History    
Social History (Reviewed 12/30/24 @ 09:00 by Latoya Roberts MD)    
Patient Tobacco Use Status:  Never used Tobacco     
Use of substances other than those prescribed or required for medical reasons:    
Yes     
Are you DNR?:  No     
Advance Directives:  No     
Advance Directives Information Provided:  Yes     
Recently lost weight without trying:  No     
Nutrition Risks:  No Nutritional Risk     
Poor oral hygiene:  No     
    
    
    
Meds    
                                    Allergies    
    
    
    
Allergy/AdvReac Type Severity Reaction Status Date / Time    
     
No Known Allergies Allergy   Verified 12/26/24 13:00    
    
    
    
                                Home Medications    
    
    
    
?Medication ?Instructions ?Recorded ?Confirmed ?Last Taken ?Type    
     
multivitamin 1 tab PO DAILY 12/26/24 12/26/24 Unknown History    
     
valsartan 80 mg tablet 80 mg PO DAILY 12/26/24 12/26/24 12/30/24 History    
    
    
    
    
Exam    
Height,Weight and Vital Signs:     
                                            
    
Height                           5 ft 11 in                                       
       
Weight                           97.069 kg                                        
       
    
    
Airway    
Mallampati Class: III    
TM Dist: >3cm    
Neck ROM: Full    
Loose/Missing/Broken Teeth: No    
Heart:     
RRR    
Lungs:     
CTA    
    
Assessment and Plan    
Assessment    
Anesthesia Assessment: Anesthesia Plan Discussed and Chart Reviewed    
Final Anesthetic Review    
History of Problems with Anesthesia: No    
NPO: Yes    
ASA Class: II    
Final Preanesthetic Review: Meds/Allgs Chart Reviewed, Consent Obtained/Reviewed  
and Anes Risks/Benef Reviewed    
Patient Risk: Low    
Procedure Risk: Intermediate    
Anesthetic Plan    
Anesthetic Plan: MAC:    
Disposition: Standard PACU

## 2024-12-30 NOTE — XMS_ITS
Progress note - 2024  
  
                          Created on: 2024  
  
  
John Stewart  
External Reference #: [id number]  
: 1960  
Sex: Male  
  
Demographics  
  
  
                                        Address             [address] [place]  [address]  
   
                                        Work Phone          [phone]  
   
                                        Home Phone          [phone]  
   
                                        Mobile Phone        [phone]  
   
                                        Email Address       [email or web address]  
   
                                        Preferred Language  en  
   
                                        Marital Status        
   
                                        Protestant Affiliation Unknown  
   
                                        Race                White  
   
                                        Ethnic Group        Not  or Lati  
no  
  
  
Author  
  
  
                                        Organization        [facility]  
   
                                        Address             [address]  
[address]  
[place], [place]  [id number]  
   
                                        Phone               [phone]  
  
  
Support  
  
  
                          Name         Relationship Address      Phone  
   
                          Pat MINOO  Emergency Contact Unknown      [phone]  
[phone]  
   
                          John Stewart Guarantor    Unknown      [phone]  
  
  
Care Team Providers  
  
  
                                Care Team Member Name Role            Phone  
   
                                Sulaiman Mercer Primary Care Provider [phone]  
[phone]  
  
  
  
ALLERGIES  
No Known Allergies  
  
REASON FOR VISIT  
Covid tested positive today was in [place] returned 3 days ago, c/o chills 
fatigue , headache sore throat, cough muscle aches, congestion nauseau diarrhea 
x 2days, Video [phone]  
  
MEDICATIONS  
  
  
                                        Medication          SIG (Take, Route,   
Frequency, Duration) Notes           Start Date      End Date        Status  
   
                                                    Ventolin HFA * 108 (90   
Base) MCG/ACT                           2 puffs as needed   
Inhalation every 4   
hrs for 30 day(s)                 2018                      Not-Taking  
   
                                        Scopolamine 1 MG/3DAYS APPLY 1 PATCH TO   
THE   
SKIN EVERY 3 DAYS   
EVERY 3 DAYS AS   
NEEDED for 12                                                   Not-Taking  
   
                                        Valsartan 80 MG     TAKE 1 TABLET BY   
MOUTH EVERY DAY                                                 Active  
   
                                        LORazepam 1 MG      1 tablet at bedtime   
as needed Orally Once   
a day for 4 days                 12/10/2021                      Not-Taking  
   
                                        Cyclobenzaprine HCl 5 MG 1 tablet as nee  
ded   
Orally Three times a   
day for 10 days                 2019                      Not-Taking  
   
                                                    HYDROcodone-Acetaminophen   
5-325 MG                                1 tablet as needed   
Orally every 6 hrs   
for 5 days                      12/10/2021                      Not-Taking  
   
                                                    Paxlovid (300/100) 20 x 150   
MG & 10 x 100MG                         3 tablets Orally   
Twice a day for 5   
day(s)                          2024                      Active  
  
  
  
VITAL SIGNS  
  
  
                                BMI             28.59 kg/m2     2024  
   
                                Height          71 in           2024  
   
                                Weight          205 lbs         2024  
   
                                                    weight is 205 at home BP not  
 taken no temp   
  
  
  
Encounters  
  
  
                      Encounter  Location   Date       Provider   Diagnosis  
   
                                        Sulaiman Mercer MD [address] [place] [address]   
[address] [place], [place]   
[id number]           2024          Sulaiman Mercer     COVID-19 U07.1  
  
  
  
ASSESSMENTS  
  
  
                      Encounter Date Diagnosis  Assessment Notes Treatment Notes  
 Treatment Clinical   
Notes  
   
                                        2024          COVID-19 (ICD-10 -   
U07.1)                                                        
  
  
  
PLAN OF TREATMENT  
Medication  
  
                      Medication Name Sig        Start Date Stop Date  Notes  
   
                                                    Paxlovid (300/100) 20 x 150   
MG   
& 10 x 100MG                            3 tablets Orally Twice a   
day for 5 day(s)    2024                                
  
Next Appt  
  
                                                    Details  
   
                                                    Provider Name:Sulaiman Lopez  
iebelinda, 2025 07:15:00 AM, [address] [place] [address],   
[address]   
[facility], [place], [place], [id number], [phone]  
   
                                                    Provider Name:Sulaiman campos, 2026 10:30:00 AM, [address] [place] [address],   
[place] [address], [place], [place], [id number], [phone]  
  
  
  
Progress Notes  
*   
  
Examination  
  
  
  
                          Category     Sub-Category Detail       Notes  
   
                                General Examination GENERAL APPEARANCE: alert, w  
ell hydrated, in no distress ,   
male   
  
  
  
                                        HEAD:               normocephalic   
  
  
  
  
  
History and Physical Notes  
*   
  
HPI (History of Present Illness)  
  
  
  
                          Category     Sub-Category Detail       Notes  
   
                                Symptom(s)      Telehealth      Location of [facility]  
ider rendering services:: [address] [place] [address], [address]                          
  
  
  
                                        Location of patient:: at address listed   
in demographics for today's visit   
   
                                        Patient identification confirmed using::  
 Name, , SSN, Insurance information   
   
                                                    Telehealth method:: Video co  
nference where patient is visible to the provider of  
  
care                                      
   
                                                    Consent:: Patient verbally c  
onsented to treatment, Patient verbally consented to  
  
billing insurance company, Patient informed of any privacy concerns related to   
method of visit

## 2024-12-30 NOTE — XMS_ITS
Patient Health Record  
  
                          Created on: 2024  
  
  
John Stewart  
External Reference #: [id number]  
: 1960  
Sex: Male  
  
Demographics  
  
  
                                        Address             [address] [place]  [address]  
   
                                        Work Phone          [phone]  
   
                                        Home Phone          [phone]  
   
                                        Mobile Phone        [phone]  
   
                                        Email Address       [email or web address]  
   
                                        Preferred Language  en  
   
                                        Marital Status        
   
                                        Scientology Affiliation Unknown  
   
                                        Race                White  
   
                                        Ethnic Group        Not  or Lati  
no  
  
  
Author  
  
  
                                        Organization        Sulaiman Mercer MD  
   
                                        Address             [address]  
[address]  
[place], [place]  [id number]  
   
                                        Phone               [phone]  
  
  
Support  
  
  
                          Name         Relationship Address      Phone  
   
                          MINOO Stewart  Emergency Contact Unknown      [phone]  
[phone]  
   
                          John Stewart Guarantor    Unknown      [phone]  
  
  
Care Team Providers  
  
  
                                Care Team Member Name Role            Phone  
   
                                Sulaiman Mercer Primary Care Provider [phone]  
[phone]  
  
  
  
ALLERGIES  
No Known Allergies  
  
RESULTS  
  
  
                          Component    Value        Reference Range Notes  
   
                                                    Complete Blood Count Auto Di  
ff  
Reviewed date:2024 04:43:11 PM  
Interpretation:  
Performing Lab:[facility], [address] [place] [phone]  
Notes/Report:   
   
                          White Blood Count 6.4          4.8-10.8 X10*3/uL   
   
                          Red Blood Count 4.80         4.60-5.80 X10*6/uL   
   
                          Hemoglobin   15.3         14.0-18.0 g/dl   
   
                          Hematocrit   46.1         42.0-52.0 %    
   
                          Mean Corpuscular Volume 96.0         80.0-98.0 fL   
   
                          Mean Corpuscular Hemoglobin 31.9         27.0-33.0 pg   
   
                          Mean Corpuscular HGB Conc 33.2         31.0-36.0 g/dl   
   
                          Red Cell Distribution Width 12.4         11.0-16.0 %    
   
                          Platelet Count 297          160-400 X10*3/uL   
   
                          Mean Platelet Volume 10.5         9.4-12.4 fL    
   
                          Neutrophils Percent Auto 43.0         45-73 %        
   
                          Imm Gran Pct Auto 0.2          0.0-0.4 %      
   
                          Lymphocytes Percent Auto 45.3         20-40 %        
   
                          Monocytes Percent Auto 9.1          2-11 %         
   
                          Eosinophils Percent Auto 1.9          0-4 %          
   
                          Basophils Percent Auto 0.5          0-2 %          
   
                          NRBC Pct Auto 0.0          0.0-0.2 /100WBC   
   
                          Neutrophils Absolute Auto 2.7          2.0-8.3 x10*3/u  
L   
   
                          Imm Gran Abs Auto 0.01         0.00-0.03 X10*3/uL   
   
                          Lymphocytes Absolute Auto 2.9          1.2-4.9 X10*3/u  
L   
   
                          Monocytes Absolute Auto 0.6          0.1-1.2 X10*3/uL   
   
                          Eosinophils Absolute Auto 0.1          0.0-0.4 X10*3/u  
L   
   
                          Basophils Absolute Auto 0.0          0.0-0.2 X10*3/uL   
   
                          NRBC Abs Auto 0.000        0.0-0.012 X10*3/uL   
   
                                                    Comprehensive [place]. Panel Fa  
st  
Reviewed date:2024 04:42:33 PM  
Interpretation:  
Performing Lab:[facility], [address] [place] [phone]  
Notes/Report:   
   
                          Sodium       142          135-145 mmol/L   
   
                          Potassium    4.3          3.3-5.1 mmol/L   
   
                          Chloride     106           mmol/L   
   
                          Carbon Dioxide 28           22-29 mmol/L   
   
                          Anion Gap    12           12-20          
   
                          Blood Urea Nitrogen 20           9-16 mg/dL     
   
                          Creatinine   1.23         0.5-1.4 mg/dL   
   
                          Estimated Glomerular Filt Rate 59                       
     
  
  
Chronic Kidney Disease:   
Estimated GFR < 60   
mL/min/1.73m2  
  
  
Severe Kidney Disease:   
Estimated GFR < 15   
mL/min/1.73m2  
   
                          Glucose Fasting 108          60-99 mg/dL    
  
  
A fasting glucose from   
100-125 mg/dl is   
considered impaired  
  
  
(pre-diabetes).  
   
                          Calcium      9.5          8.4-10.2 mg/dL   
   
                          Bilirubin Total 0.7          0.0-1.0 mg/dL   
   
                          Aspartate Amino Transferase 45           5-37 U/L       
   
                          Alanine Aminotransferase 56           0-40 U/L       
   
                          Total Protein 7.3          6.5-8.0 g/dL   
   
                          Albumin Level 4.2          3.5-5.0 g/dL   
   
                          Alkaline Phosphatase 63            U/L     
   
                                                    Lipid Panel  
Reviewed date:2024 04:43:23 PM  
Interpretation:  
Performing Lab:[facility], [address] [place] [phone]  
Notes/Report:   
   
                          Triglycerides 488          <150 mg/dL     
  
  
Desirable Triglyceride:   
less than 150 mg/dL  
  
  
Borderline High   
Triglyceride 150-199 mg/dL  
  
  
High Triglyceride: 200-499   
mg/dL  
  
  
Very High Triglyceride:   
greater than or equal to  
  
  
5OO mg/dL  
   
                          Cholesterol  213          <200 mg/dL     
  
  
Desirable Cholesterol:   
less than 200 mg/dL  
  
  
Borderline High   
Cholesterol: 200-239 mg/dL  
  
  
High Cholesterol: greater   
than 239 mg/dL  
   
                          LDL Cholesterol Calculated TNP          <100 mg/dL     
  
  
Unable to calculate the   
LDL. The formula of   
Friedwald,  
  
  
Root, and Roger is   
only valid if the   
triglycerides are  
  
  
less than 400 mg/dl.  
   
                          HDL Cholesterol 32           >40 mg/dL      
  
  
Desirable HDL: greater   
than 40 mg/dL  
  
  
Note: This HDL assay may   
give artificially  
  
  
low results in patients   
with liver disease.  
   
                                                    PSA,Total (Free>4and<10)  
Reviewed date:2024 04:42:40 PM  
Interpretation:  
Performing Lab:[address] [phone]  
Notes/Report:   
   
                          PSA,Total (Free>4and<10) 1.32         0.00-4.00 ng/mL   
  
  
A Free PSA was not   
performed:  
  
  
The percentage of Free PSA   
can be used to enhance the  
  
  
differentiation of   
prostate cancer from   
benign prostatic  
  
  
disease in subjects whose   
PSA levels are between 4.0   
and  
  
  
10.0 ng/mL. For subjects   
whose PSA levels are below   
4.0 or  
  
  
above 10.0 ng/mL, the risk   
of prostate cancer is   
determined  
  
  
on the basis of the PSA   
alone. Therefore the %   
Free PSA  
  
  
is recommended only for   
those subjects whose PSA   
levels  
  
  
are between 4.0 and 10.0   
ng/mL.  
  
  
PSA methodology: Abbott   
Alinity i Chemiluminescent  
  
  
Microparticle Immunoassay   
(CMIA)  
   
                                                    Microalbumin, Random  
Reviewed date:2024 04:42:47 PM  
Interpretation:  
Performing Lab:[facility], [address] [place] [phone]  
Notes/Report:   
   
                          Creatinine Urine 101.61                      
   
                          Microalbumin Urine 5.0                         
   
                          Microalbum/Creatinine Ratio Ur 4.9          <30 ug/mg   
cr   
  
  
Albumin/Creatinine Ratio   
Reference Ranges:  
  
  
Normal: < 30 ug/mg   
creatinine  
  
  
Microalbuminuria: 30 - 300   
ug/mg creatinine  
  
  
Clinical Albuminuria: >   
300 ug/mg creatinine  
   
                                                    Hemoglobin A1c  
Reviewed date:2024 04:42:24 PM  
Interpretation:  
Performing Lab:[facility], [address] [place] [phone]  
Notes/Report:   
   
                          Hemoglobin A1c % 5.6          <6.0 %         
  
  
Hemoglobin A1C Reference   
Range  
  
  
Adults: 4.8 - 6.0 %  
  
  
Non diabetic: < 6.0 %  
  
  
Goal: < 7.0 %  
  
  
Additional Action   
Suggested: > 8.0 %  
  
  
Note: Hemoglobin A1c   
results are invalid for   
patients  
  
  
with abnormal amounts of   
HbF. Blood transfusions  
  
  
may impact the HbA1c   
concentration in the   
patient  
  
  
sample.  
   
                          Estimated Average Glucose 114                         
  
  
eAG =  Estimated average   
glucose  which is %A1C   
expressed as  
  
  
average glucose, using the   
formula of the A1C-Derived  
  
  
Average Glucose study   
(ADAG), Diabetes Care,   
Vol.31,#8,  
  
  
Aug.   
   
                                                    UA ClnCatch+Micro w/rflx Cul  
t  
Reviewed date:2024 04:43:46 PM  
Interpretation:  
Performing Lab:[facility], [address] [place] [phone]  
Notes/Report:  
Urine, Clean Catch   
   
                          Color Urine  Yellow                      
   
                          Appearance Urine Clear                       
   
                          PH           5.5          5.0-9.0        
   
                          Glucose Urine UA Negative     Negative mg/dL   
   
                          Urine Blood  Negative     Negative       
   
                          Specific Gravity - Urine 1.015        1.005-1.025    
   
                          Urine Protein Negative     Neg-Trace mg/dL   
   
                          Urine Ketones Negative     Negative mg/dL   
   
                          Nitrite Urine Negative     Negative       
   
                          Leukocyte Esterase Urine Negative     Negative       
   
                          RBC Urine    0-2          0-2 /HPF       
   
                          WBC Urine    0-5          0-5 /HPF       
   
                          Squamous Epithelial Cell Urine 0-2          0-2 /HPF    
     
   
                          Bacteria Urine None Seen    None Seen      
   
                          Hyaline Casts Urine 0-2          0-2 /LPF       
   
                                                    Electrocardiogram (EKG)  
Reviewed date:2024 03:19:23 PM  
Interpretation:  
Performing Lab:  
Notes/Report:  
   
  
  
  
REASON FOR REFERRAL  
  
  
                                        Reason              pain right shoulder  
   
                                        Diagnosis 1         Pain in right should  
er (M25.511)  
   
                                        Referral Organization Sulaiman Mercer MD  
   
                                        Referring Provider First Name Sulaiman  
   
                                        Referring Provider Last Name Sylvie  
   
                                        Referring Provider Speciality Internal M  
edicine  
   
                                        Referred Provider   SCOTTIE AKINS  
   
                                        Referred Provider Specialty Orthopedic S  
urgery  
   
                                        General Notes       Terra Dougherty  11:38:12 AM EST > info   
faxed  
   
                                        Referral Priority   Routine  
  
  
  
MEDICATIONS  
  
  
                                        Medication          SIG (Take, Route,   
Frequency, Duration) Notes           Start Date      End Date        Status  
   
                                        Valsartan 80 MG     TAKE 1 TABLET BY   
MOUTH EVERY DAY                                                 Active  
   
                                                    Ventolin HFA * 108 (90   
Base) MCG/ACT                           2 puffs as needed   
Inhalation every 4   
hrs for 30 day(s)                 2018                      Not-Taking  
   
                                                    HYDROcodone-Acetaminophen   
5-325 MG                                1 tablet as needed   
Orally every 6 hrs   
for 5 days                      12/10/2021                      Not-Taking  
   
                                        Cyclobenzaprine HCl 5 MG 1 tablet as nee  
ded   
Orally Three times a   
day for 10 days                 2019                      Not-Taking  
   
                                        LORazepam 1 MG      1 tablet at bedtime   
as needed Orally Once   
a day for 4 days                 12/10/2021                      Not-Taking  
  
  
  
IMMUNIZATIONS  
  
  
                      Vaccine    Route      Administration Date Status     Comme  
nts  
   
                      Flu Vaccine Unknown    10/22/2013 Administered Given at wo  
rk;   
[facility]  
   
                      Flu Vaccine Unknown    10/07/2014 Administered [facility]  
   
                      Fluarix Quadrivalent Unknown    2016 Administered [facility]  
   
                      Shingrix   IM Intramuscular 2018 Administered   
   
                      Shingrix   IM Intramuscular 2019 Administered   
   
                      Fluarix Quadrivalent Unknown    10/11/2020 Administered [facility]  
[facility]  
   
                      SARS-COV-2 Pfizer Unknown    2021 Administered   
   
                      SARS-COV-2 Pfizer Unknown    2021 Administered   
   
                      SARS-COV-2 Pfizer Unknown    10/06/2021 Administered   
   
                      Fluarix Quadrivalent Unknown    10/06/2021 Administered   
   
                      SARS-COV-2 Pfizer Unknown    2022 Administered [facility]  
   
                      Fluarix Quadrivalent Unknown    2022 Administered [facility]  
S  
   
                      SARS-COV-2 Pfizer Unknown    2023 Administered   
  
  
  
SOCIAL HISTORY  
Tobacco Use:  
  
                                Social History Observation Description     Date  
   
                                Details (start date - stop date) Never Smoker     
 NA - NA  
  
Sex Assigned At Birth:  
  
                                        Social History Observation Description  
   
                                        Sex Assigned At Birth Unknown  
  
Tobacco Use/Smoking  
  
                                Question        Answer          Notes  
   
                                Patient is a    nonsmoker         
   
                                        Additional Findings: Tobacco Non-User Cu  
rrent non-smoker, currently using no   
form of tobacco                           
  
Alcohol Screen  
  
                                Question        Answer          Notes  
   
                                                    Did you have a drink contain  
ing alcohol in   
the past year?            Yes                         
   
                                                    How often did you have a dri  
nk containing   
alcohol in the past year? 4 or more times a week (4 points)   
   
                                                    How many drinks did you have  
 on a typical day   
when you were drinking in the past year? 1 or 2 drinks (0 point)     
   
                                                    How often did you have 6 or   
more drinks on   
one occasion in the past year? Never (0 point)             
   
                                Points          4                 
   
                                Interpretation  Positive          
  
  
  
PROBLEMS  
  
  
                                                    Problem   
Type                      ICD Code                  Onset   
Dates                                   Problem   
Status          W/U Status      Risk            SNOMED Code     Notes  
   
                                        Problem             Lymphocytosis   
(D72.820)             Active     confirmed             00539052     
   
                                        Problem             Other chronic   
pain (G89.29)            Active     confirmed             60322095     
   
                                        Problem             Lumbar disc   
disease (M51.9)            Active     confirmed             801110553    
   
                                        Problem             Essential   
hypertension   
(I10)                     Active       confirmed                 Essential   
hypertension   
(26339497)                                
   
                                        Problem             Prediabetes   
(R73.09)                  Active       confirmed                 Prediabetes   
(686212130)                               
   
                                        Problem             High   
triglycerides   
(E78.1)               Active     confirmed             088474812    
   
                                        Problem             Cervical disc   
disease (M50.90)            Active     confirmed             926909668    
   
                                        Problem             Sciatica of left   
side (M54.32)            Active     confirmed             22863813     
   
                                        Problem             Heberden's node   
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    
   
                                        Problem             Renal cyst   
(N28.1)               Active     confirmed             367176632    
   
                                        Problem             Kidney cyst,   
acquired (N28.1)            Active     confirmed             924061634    
   
                                        Problem             Adenomatous   
rectal polyp   
(D12.8)               Active     confirmed             [id number]   
   
                                        Problem             Elevated   
cholesterol with   
high   
triglycerides   
(E78.2)               Active     confirmed             012733058    
   
                                        Problem             Skin melanoma   
(C43.9)               Active     confirmed             76126853     
  
  
  
VITAL SIGNS  
  
  
                          Blood pressure diastolic 76 mm Hg     2024   jaret  
ght is up 6 pounds since 24  
   
                          Height       71 in        2024   weight is up 6   
pounds since 24  
   
                          Blood pressure systolic 118 mm Hg    2024   weig  
ht is up 6 pounds since 24  
   
                          Weight       211 lbs      2024   weight is up 6   
pounds since 24  
   
                          BMI          29.43 kg/m2  2024   weight is up 6   
pounds since 24  
  
  
  
Encounters  
  
  
                      Encounter  Location   Date       Provider   Diagnosis  
   
                                                    Sulaiman Mercer MD                                      [address]   
[address] [place], [place] [phone]        2024          Sulaiman Mercer     Family history of   
abdominal aortic   
aneurysm (AAA) Z82.49   
; Annual physical   
exam Z00.00 ; Right   
tennis elbow M77.11 ;   
Plantar fasciitis of   
right foot M72.2 ;   
Pain in right   
shoulder M25.511 ;   
Other chronic pain   
G89.29 ; Elevated   
LFTs R79.89 ;   
Lymphocytosis D72.820   
; Elevated   
cholesterol with high   
triglycerides E78.2 ;   
Colon cancer   
screening Z12.11 ;   
Depression screening   
Z13.31 and Essential   
hypertension I10  
   
                                                    Sulaiman Mercer MD                                      [address]   
[address] [place], [place] [phone]        2024          Sulaiman Mercer     Blood tests for   
routine general   
physical examination   
Z00.00 ; High   
triglycerides E78.1 ;   
Prediabetes R73.09   
and Essential   
hypertension I10  
   
                                                    Sulaiman Mercer MD                                      [address]   
[address] [place] [phone]        2024          Sulaiman Mercer     Essential   
hypertension I10 and   
Skin melanoma C43.9  
   
                                                    Sulaiman Mercer MD                                      [address]   
[address] [place] [phone]        2024          Sulaiman Mercer MD                                      [address]   
[address] [place] [phone]        2024          Sulaiman Mercer MD                                      [address]   
[address] [place] [phone]        2024          Sulaiman Mercer     COVID-19 U07.1  
  
  
  
ASSESSMENTS  
  
  
                      Encounter Date Diagnosis  Assessment Notes Treatment Notes  
 Treatment   
Clinical Notes  
   
                                        2024          Annual physical exam  
   
(ICD-10 - Z00.00)                                   labs reviewed   
and discussed   
with patient                              
   
                                        2024          Family history of   
abdominal aortic   
aneurysm (AAA)   
(ICD-10 - Z82.49)                                   us order faxed   
to [facility] CS dept                            
   
                                        2024          High triglycerides   
(ICD-10 - E78.1)                                              
   
                                        2024          Blood tests for   
routine general   
physical examination   
(ICD-10 - Z00.00)                                             
   
                                        2024          Essential   
hypertension (ICD-10   
- I10)                                              doing great on   
med, will   
continue current   
regiment                                  
   
                                        2024          Skin melanoma (ICD-1  
0   
- C43.9)                                            need note from   
[facility]   
derm/ REQUEST   
MADE BY PHONE   
MESSAGE TO FAX   
THE OFFICE NOTES   
TO PCP                                    
   
                                        2024          COVID-19 (ICD-10 -   
U07.1)                                                        
   
                                        2024          Right tennis elbow   
(ICD-10 - M77.11)                                   advised to where   
tennis elbow   
brace and ice                             
   
                                        2024          Prediabetes (ICD-10   
-   
R73.09)                                                       
   
                                        2024          Plantar fasciitis of  
   
right foot (ICD-10 -   
M72.2)                                              advised to use   
arch supports                             
   
                                        2024          Essential   
hypertension (ICD-10   
- I10)                                                        
   
                                        2024          Pain in right   
shoulder (ICD-10 -   
M25.511)                                referral to [facility]      
   
                                        2024          Other chronic pain   
(ICD-10 - G89.29)                                             
   
                                        2024          Elevated LFTs (ICD-1  
0   
- R79.89)                                           decrease etoh   
and repeat lft                            
   
                                        2024          Lymphocytosis (ICD-1  
0   
- D72.820)                                          repeat cbc in 2   
mo                                        
   
                                        2024          Elevated cholesterol  
   
with high   
triglycerides (ICD-10   
- E78.2)                                            advised on diet   
and exercise                              
   
                                        2024          Colon cancer   
screening (ICD-10 -   
Z12.11)                                 guaiac negative       
   
                                        2024          Depression screening  
   
(ICD-10 - Z13.31)                       negative screen       
   
                                        2024          Essential   
hypertension (ICD-10   
- I10)                                              stable, will   
continue current   
regiment                                  
  
  
  
PLAN OF TREATMENT  
Pending Test  
  
                                        Test Name           Order Date  
   
                                        Electrocardiogram (EKG) 2013  
   
                                        Electrocardiogram (EKG) 10/06/2017  
   
                                        Electrocardiogram (EKG) 2018  
   
                                        Electrocardiogram (EKG) 2019  
   
                                        CARDIOVASCULAR STRESS TEST 12/10/2021  
   
                                        CT ABD & PELVIS WITH CONTRAST 2017  
   
                                        US ABD AORTA        2024  
  
Future Test  
  
                                        Test Name           Order Date  
   
                                        Complete Blood Count Auto Diff   
   
                                        Liver Panel         2025  
   
                                        Lipid Panel         2025  
  
Next Appt  
  
                                                    Details  
   
                                                    Provider Name:Sulaiman Lopez  
ier, 2025 07:15:00 AM, [address] [place] [address],   
[address]   
[address] [place] [address], [id number], [phone]  
   
                                                    Provider Name:Sulaiman Lopez  
ier, 2026 10:30:00 AM, [address] [place] [address],   
[address]   
[address] [place] [address], [id number], [phone]  
  
  
  
Insurance Providers  
  
  
                                                    Payer   
Name                                    Payer   
Address                                 Payer   
Phone                                   Subscriber   
Number                                  Group   
Number                                  Insured   
Name                                    Patient   
Relationship   
to Insured                              Coverage   
Start   
Date                                    Coverage   
End Date  
   
                                                    BLUE   
CROSS   
AND BLUE   
SHIELD                                   [address]   
[id number]   
[place], [place]   
[id number]                               [phone]               [id number]                            John Stewart                                    Self -   
patient is   
the insured                                           
  
  
  
MEDICAL (GENERAL) HISTORY  
Medical History  
  
                                        History             ICD Code  
   
                                                    colonoscopy 14, Dr. Jaret goel - 5 year f/u; Colonoscopy done 10/18/19 by   
Dr. Prado - repeat 5 years                
   
                                        ultrasound kidneys need no further follo  
w up

## 2024-12-30 NOTE — XMS_ITS
Progress note - 2024  
  
                          Created on: 2024  
  
  
NISREEN GANDHI  
External Reference #: [id number]  
: 1960  
Sex: Male  
  
Demographics  
  
  
                                        Address             [address] [place]  [address]  
   
                                        Home Phone          [phone]  
   
                                        Mobile Phone        [phone]  
   
                                        Email Address       [email or web address]  
   
                                        Preferred Language  en  
   
                                        Marital Status        
   
                                        Sikhism Affiliation Unknown  
   
                                        Race                White  
   
                                        Ethnic Group        Not  or Lati  
no  
  
  
Author  
  
  
                                        Organization        [facility]  
   
                                        Address             [address]  
[address]  
[place]  [address]  
   
                                        Phone               [phone]  
  
  
Support  
  
  
                          Name         Relationship Address      Phone  
   
                                NICOLAS GANDHI   Emergency Contact [address]  
R  
[place]  [id number] [phone]  
   
                          NISREEN GANDHI Guarantor    Unknown      [phone]  
  
  
Care Team Providers  
  
  
                                Care Team Member Name Role            Phone  
   
                                Sulaiman Mercer MD Primary Care Provider John Bennett   Unavailable     [phone]  
  
  
  
REASON FOR VISIT  
SCREENING COLON, gerd  
  
Encounters  
  
  
                      Encounter  Location   Date       Provider   Diagnosis  
   
                                        [facility]      [address] [place] [address]   
[id number]           2024          John Prado          
  
  
  
PLAN OF TREATMENT  
Next Appt  
  
                                                    Details  
   
                                                    Provider Name:John Prado  
, 2024 08:40:00 AM, [address] [place] [address],   
[id number], [phone]

## 2024-12-30 NOTE — OP_ITS
DATE OF SERVICE:  12/30/2024  
   
SURGEON:     
John Prado MD  
   
INDICATIONS:    
The patient presents for evaluation of gastroesophageal reflux, personal  
history of tubular adenoma of the colon, and need for colorectal cancer  
screening.  Full consent was obtained from him for this, including risks of  
bleeding and perforation.   
   
PREOPERATIVE DIAGNOSIS:    
   
POSTOPERATIVE DIAGNOSIS:  
   
PROCEDURE PERFORMED:  
   
ESTIMATED BLOOD LOSS:  
   
COMPLICATIONS:  
   
ANESTHESIA:  
Monitored anesthesia care.   
   
ASSISTANTS:  
   
SPECIMENS:  
   
PREOPERATIVE DIAGNOSES:    
Gastroesophageal reflux, personal history of tubular adenomas of the colon,  
colorectal cancer screening.   
   
POSTOPERATIVE DIAGNOSES:    
Gastroesophageal reflux, personal history of tubular adenomas of the colon,  
colorectal cancer screening, esophagitis, hiatal hernia, colon polyp,  
diverticulosis, and internal hemorrhoids.   
   
PROCEDURES PERFORMED:    
Esophagogastroduodenoscopy with biopsies, and colonoscopy to the cecum with  
biopsy and removal of polyp.   
   
DESCRIPTION OF PROCEDURE:    
The patient was placed in the left lateral decubitus position.  The Olympus  
video gastroscope was passed in the posterior oropharynx and upper esophagus  
under direct vision.  The scope was passed slowly into the distal esophagus.  
The gastroesophageal junction appeared at 39 cm.  There was an area of edema,  
less than 1 cm ulceration with some surrounding edema and erosion, and some  
slight irregularity consistent with possible Quezada's mucosa.  The scope  
entered the stomach.  There was a small hiatal hernia.  The scope was advanced  
to the pylorus, and the duodenum was cannulated at the descending portion.  The  
duodenum including the bulb appeared normal without mass or ulceration.  The  
scope was withdrawn back in the stomach.  The gastric antrum and body appeared  
normal with good peristalsis.  The scope was retroflexed visualizing the  
proximal stomach carefully, which appeared normal, without any sign of mass or  
ulceration.  The scope was straightened and withdrawn back to the esophagus.  
Multiple biopsies were obtained from the area of ulceration and inflammation at 
the EG Junction..  
Proximal to this, the esophageal mucosa appeared normal.  The scope was  
withdrawn from the patient.  He was turned around for the colonoscopy.   
   
The digital rectal exam revealed no abnormalities.  The Olympus video pediatric  
colonoscope was entered into the rectum, advanced to the cecum.  Once in the  
cecum, I did identify normal appearing cecal pouch with appendiceal orifice and  
a normal-appearing ileocecal valve.  There was transillumination of light deep  
in the right lower quadrant.  The entire cecum and ileocecal valve appeared  
normal.  The scope was slowly withdrawn assessing all mucosal surfaces  
carefully.  Preparation was excellent.   
   
At 50 cm, was a flat, approximately 6 mm polyp, which was biopsied and  
completely removed with cold biopsy forceps.   
   
I did not visualize any other polyps, colitis, nor angiodysplasia.  There was a  
moderate amount of sigmoid diverticulosis.  In the rectum, scope was  
retroflexed visualizing internal hemorrhoids, but no other pathology.  The  
rectal mucosa appeared normal.  The scope was straightened and withdrawn from  
the patient.  He tolerated both procedures well and was returned to the  
recovery area in stable condition.   
   
IMPRESSION:    
1. Erosive esophagitis.  
2. Hiatal hernia.  
3. Colon polyp.  
4. Diverticulosis.  
5. Internal hemorrhoids.  
   
PLAN:    
The results of the biopsies will be checked.  I would recommend a repeat 
colonoscopy  
in 5 years.   
   
I will start him on omeprazole 40 mg daily in regard to the upper endoscopy  
findings.  He does describe taking Pepto-Bismol every day for indigestion.  
Depending on the results of biopsies and his clinical course, we may want to  
have him undergo repeat upper endoscopy in the next 2 to 3 months to assess for  
healing.  He will be seen in followup as well.  He was advised not to use any  
aspirin and NSAIDs for 1 week. This has been discussed with his wife.  
   
   
   
MD NELY Brothers/BARBARA  
DD:  12/30/2024 10:16:20  
DT:  12/30/2024 10:27:08  
Job #:  [id number] / [id number]  
   
 MTDVALARIE

## 2024-12-30 NOTE — PM.OP
Brief Operative Note
Date of Service: 12/30/24
Pre-op diagnosis: 
GERD, Screening
Post-op diagnosis: other (Esophagitis, Colon polyp)
Procedure: 
EGD with biopsies, Colonoscopy to the cecum with bx/removal of polyp
Surgeon: 
John Prado MD

Anesthesia: MAC
Was an Assistant used for this Procedure?: No
Estimated blood loss (mL): 2.0
Pathology: other (A. EG Junction at 39cm B. Polyp at 50cm)
Condition: stable
Disposition: PACU

## 2024-12-30 NOTE — XMS_ITS
Progress note - 2024  
  
                          Created on: 2024  
  
  
John Stewart  
External Reference #: [id number]  
: 1960  
Sex: Male  
  
Demographics  
  
  
                                        Address             [address] [place]  [address]  
   
                                        Work Phone          [phone]  
   
                                        Home Phone          [phone]  
   
                                        Mobile Phone        [phone]  
   
                                        Email Address       [email or web address]  
   
                                        Preferred Language  en  
   
                                        Marital Status        
   
                                        Baptism Affiliation Unknown  
   
                                        Race                White  
   
                                        Ethnic Group        Not  or Lati  
no  
  
  
Author  
  
  
                                        Organization        Sulaiman Mercer MD  
   
                                        Address             [address]  
[address]  
[place], [place]  [id number]  
   
                                        Phone               [phone]  
  
  
Support  
  
  
                          Name         Relationship Address      Phone  
   
                          Pat MINOO  Emergency Contact Unknown      [phone]  
[phone]  
   
                          John Stewart Guarantor    Unknown      [phone]  
  
  
Care Team Providers  
  
  
                                Care Team Member Name Role            Phone  
   
                                Sulaiman Mercer Primary Care Provider [phone]  
[phone]  
  
  
  
ALLERGIES  
No Known Allergies  
  
RESULTS  
  
  
                          Component    Value        Reference Range Notes  
   
                                                    Electrocardiogram (EKG)  
Reviewed date:2024 03:19:23 PM  
Interpretation:  
Performing Lab:  
Notes/Report:  
0   
  
  
  
REASON FOR REFERRAL  
  
  
                                        Reason              pain right shoulder  
   
                                        Diagnosis 1         Pain in right should  
er (M25.511)  
   
                                        Referral Organization Sulaiman Mercer MD  
   
                                        Referring Provider First Name Sulaiman  
   
                                        Referring Provider Last Name Sylvie  
   
                                        Referring Provider Speciality Internal M  
edicine  
   
                                        Referred Provider   SCOTTIE AKINS  
   
                                        Referred Provider Specialty Orthopedic S  
urgery  
   
                                        General Notes       Terra Dougherty  11:38:12 AM EST > info   
faxed  
   
                                        Referral Priority   Routine  
  
  
  
REASON FOR VISIT  
ANNUAL EXAM  
  
MEDICATIONS  
  
  
                                        Medication          SIG (Take, Route,   
Frequency, Duration) Notes           Start Date      End Date        Status  
   
                                        Valsartan 80 MG     TAKE 1 TABLET BY   
MOUTH EVERY DAY                                                 Active  
   
                                                    Ventolin HFA * 108 (90   
Base) MCG/ACT                           2 puffs as needed   
Inhalation every 4   
hrs for 30 day(s)                 2018                      Not-Taking  
   
                                                    HYDROcodone-Acetaminophen   
5-325 MG                                1 tablet as needed   
Orally every 6 hrs   
for 5 days                      12/10/2021                      Not-Taking  
   
                                        Cyclobenzaprine HCl 5 MG 1 tablet as nee  
ded   
Orally Three times a   
day for 10 days                 2019                      Not-Taking  
   
                                        LORazepam 1 MG      1 tablet at bedtime   
as needed Orally Once   
a day for 4 days                 12/10/2021                      Not-Taking  
  
  
  
SOCIAL HISTORY  
Tobacco Use:  
  
                                Social History Observation Description     Date  
   
                                Details (start date - stop date) Never Smoker     
 NA - NA  
  
Sex Assigned At Birth:  
  
                                        Social History Observation Description  
   
                                        Sex Assigned At Birth Unknown  
  
Tobacco Use/Smoking  
  
                                Question        Answer          Notes  
   
                                Patient is a    nonsmoker         
   
                                        Additional Findings: Tobacco Non-User Cu  
rrent non-smoker, currently using no   
form of tobacco                           
  
Alcohol Screen  
  
                                Question        Answer          Notes  
   
                                                    Did you have a drink contain  
ing alcohol in   
the past year?            Yes                         
   
                                                    How often did you have a dri  
nk containing   
alcohol in the past year? 4 or more times a week (4 points)   
   
                                                    How many drinks did you have  
 on a typical day   
when you were drinking in the past year? 1 or 2 drinks (0 point)     
   
                                                    How often did you have 6 or   
more drinks on   
one occasion in the past year? Never (0 point)             
   
                                Points          4                 
   
                                Interpretation  Positive          
  
  
  
PROBLEMS  
  
  
                                Problem Type    ICD Code        Onset   
Dates                                   Problem   
Status          W/U Status      Risk            SNOMED Code     Notes  
   
                                        Problem             Other chronic pain   
(G89.29)              Active     confirmed             76840103     
   
                                        Problem             Lymphocytosis   
(D72.820)             Active     confirmed             52004176     
   
                                        Problem             Elevated   
cholesterol with   
high triglycerides   
(E78.2)               Active     confirmed             [id number]    
  
  
  
VITAL SIGNS  
  
  
                                BMI             29.43 kg/m2     2024  
   
                                Blood pressure systolic 118 mm Hg       20  
24  
   
                                Blood pressure diastolic 76 mm Hg          
024  
   
                                Height          71 in           2024  
   
                                Weight          211 lbs         2024  
   
                                                    weight is up 6 pounds since   
24   
  
  
  
Encounters  
  
  
                      Encounter  Location   Date       Provider   Diagnosis  
   
                                                    Sulaiman Mercer MD                                      [address] [place] [address]   
[address] [place] [id number]        2024          Sulaiman Mercer     Family history of   
abdominal aortic   
aneurysm (AAA) Z82.49   
; Annual physical   
exam Z00.00 ; Right   
tennis elbow M77.11 ;   
Plantar fasciitis of   
right foot M72.2 ;   
Pain in right   
shoulder M25.511 ;   
Other chronic pain   
G89.29 ; Elevated   
LFTs R79.89 ;   
Lymphocytosis D72.820   
; Elevated   
cholesterol with high   
triglycerides E78.2 ;   
Colon cancer   
screening Z12.11 ;   
Depression screening   
Z13.31 and Essential   
hypertension I10  
  
  
  
ASSESSMENTS  
  
  
                      Encounter Date Diagnosis  Assessment Notes Treatment Notes  
 Treatment   
Clinical Notes  
   
                                        2024          Family history of   
abdominal aortic   
aneurysm (AAA)   
(ICD-10 - Z82.49)                                   us order faxed   
to [facility]                            
   
                                        2024          Annual physical exam  
   
(ICD-10 - Z00.00)                                   labs reviewed   
and discussed   
with patient                              
   
                                        2024          Right tennis elbow   
(ICD-10 - M77.11)                                   advised to where   
tennis elbow   
brace and ice                             
   
                                        2024          Plantar fasciitis of  
   
right foot (ICD-10 -   
M72.2)                                              advised to use   
arch supports                             
   
                                        2024          Pain in right   
shoulder (ICD-10 -   
M25.511)                                referral to [facility]      
   
                                        2024          Other chronic pain   
(ICD-10 - G89.29)                                             
   
                                        2024          Elevated LFTs (ICD-1  
0   
- R79.89)                                           decrease etoh   
and repeat lft                            
   
                                        2024          Lymphocytosis (ICD-1  
0   
- D72.820)                                          repeat cbc in 2   
mo                                        
   
                                        2024          Elevated cholesterol  
   
with high   
triglycerides (ICD-10   
- E78.2)                                            advised on diet   
and exercise                              
   
                                        2024          Colon cancer   
screening (ICD-10 -   
Z12.11)                                 guaiac negative       
   
                                        2024          Depression screening  
   
(ICD-10 - Z13.31)                       negative screen       
   
                                        2024          Essential   
hypertension (ICD-10   
- I10)                                              stable, will   
continue current   
regiment                                  
  
  
  
PLAN OF TREATMENT  
Medication  
  
                      Medication Name Sig        Start Date Stop Date  Notes  
   
                      Valsartan 80 MG TAKE 1 TABLET BY MOUTH EVERY DAY            
               
  
Treatment Notes  
  
                                        Assessment          Notes  
   
                                                    Family history of abdominal   
aortic aneurysm   
(AAA)                                   us order faxed to [facility]  
   
                                        Annual physical exam labs reviewed and d  
iscussed with patient  
   
                                        Right tennis elbow  advised to where ten  
nis elbow brace and   
ice  
   
                                        Plantar fasciitis of right foot advised   
to use arch supports  
   
                                        Pain in right shoulder referral to [facility]  
   
                                        Elevated LFTs       decrease etoh and re  
peat lft  
   
                                        Lymphocytosis       repeat cbc in 2 mo  
   
                                        Elevated cholesterol with high triglycer  
ides advised on diet and exercise  
   
                                        Colon cancer screening guaiac negative  
   
                                        Depression screening negative screen  
   
                                        Essential hypertension stable, will cont  
inue current regiment  
  
Pending Test  
  
                                        Test Name           Order Date  
   
                                        US ABD AORTA        2024  
  
Future Test  
  
                                        Test Name           Order Date  
   
                                        Complete Blood Count Auto Diff   
   
                                        Liver Panel         2025  
   
                                        Lipid Panel         2025  
  
Referrals  
  
                                        Referral Date       Details  
   
                                                            pain right shoulder,  
 [facility] [place]  
  
Next Appt  
  
                                                    Details  
   
                                                    Provider Name:Sulaiman campos, 2025 07:15:00 AM, [address],   
[address], [place], [place], [phone], [phone]  
   
                                                    Provider Name:Sulaiman Lopez  
andres, 2026 10:30:00 AM, [address],   
[address], [place], [place], [id number], [phone]  
  
  
  
Progress Notes  
*   
  
Examination  
  
  
  
                          Category     Sub-Category Detail       Notes  
   
                                General Examination GENERAL APPEARANCE: well dev  
eloped, well nourished, in no   
acute   
distress   
  
  
  
                                        HEAD:               normocephalic, atrau  
matic   
   
                                        EYES:               pupils equal, round,  
 reactive to light and accommodation, sclera non-  
icteric   
   
                                        EARS:               normal   
   
                                        THROAT:             clear   
   
                                        NECK/THYROID:       neck supple, full ra  
nge of motion, no cervical lymphadenopathy, no  
bruits   
   
                                        HEART:              regular rate and rhy  
thm, S1, S2 normal, no murmurs   
   
                                        LUNGS:              clear to auscultatio  
n bilaterally   
   
                                        ABDOMEN:            soft, nontender, non  
distended, bowel sounds present, normal, no   
organomegaly , no masses palpable   
   
                                        NEUROLOGIC:         nonfocal, motor stre  
ngth normal upper and lower extremities, sensory  
exam intact   
   
                                        SKIN:               warm and dry, no mone  
picious lesions   
   
                                        EXTREMITIES:        no clubbing, cyanosi  
s, or edema   
   
                                        MALE GENITOURINARY: not examined   
   
                                        RECTAL EXAM:        normal tone, no exte  
rnal hemorrhoids, no masses palpable, prostate   
normal, stool guaiac negative   
   
                                        ORAL CAVITY:        mucosa moist   
  
  
  
  
  
History and Physical Notes  
*   
  
HPI (History of Present Illness)  
  
  
  
                          Category     Sub-Category Detail       Notes  
   
                                Depression Screening PHQ-9           Little inte  
rest or pleasure in doing things:   
Not at all                                
  
  
  
                                        Feeling down, depressed, or hopeless: No  
t at all   
   
                                        Trouble falling or staying asleep, or sl  
eeping too much: Not at all   
   
                                        Feeling tired or having little energy: N  
ot at all   
   
                                        Poor appetite or overeating: Not at all   
   
                                                    Feeling bad about yourself o  
r that you are a failure, or have let yourself or   
your   
family down: Not at all                   
   
                                                    Trouble concentrating on thi  
ngs, such as reading the newspaper or watching   
television: Not at all                    
   
                                                    Moving or speaking so slowly  
 that other people could have noticed; or the   
opposite,   
being so fidgety or restless that you have been moving around a lot more than   
usual: Not at all                         
   
                                                    Thoughts that you would be b  
cassandra off dead or of hurting yourself in some way:   
Not   
at all                                    
   
                                        Total Score: 0        
  
  
  
                                Interpretation and Intervention Depression Daniele  
gustavo Findings: Negative   
  
  
  
                                        Follow-Up for Depression: : review of PH  
Q-9 found negative result, no follow-up   
needed  
  
  
  
                                SDOH Questions  SDOH Questions  In the past year  
 have you been worried about   
losing   
housing?: No                              
  
  
  
                                                    In the past year have you or  
 any family members you live with been unable to get  
  
any of the following when it was really needed? Check all that apply:: None   
  
  
  
                          Fall Risk    History      Have you had any falls with   
injury in the past year?: No   
  
  
  
                                        Have you had two or more falls in the  year?: No   
  
  
  
                                Communication Needs Communication Needs Does the  
 patient have a hearing   
impairment:   
No                                        
  
  
  
                                        Does the patient have a vision impairmen  
t?: Yes   
   
                                        ?If yes, what is the vision impairment?:  
 Glasses   
   
                                        Does the patient have a cognition impair  
ment?: No   
  
  
  
  
  
Consultation Request Notes  
  
  
                          Referral Date Referring Provider Referred Provider Not  
robert  
   
                          2024   Sulaiman Mercer, ORTHOPED[facility]  
 pain right shoulder

## 2025-01-17 ENCOUNTER — HOSPITAL ENCOUNTER (OUTPATIENT)
Dept: HOSPITAL 103 - HO.US | Age: 65
Discharge: HOME | End: 2025-01-17
Payer: MEDICARE

## 2025-01-17 DIAGNOSIS — Z82.49: Primary | ICD-10-CM

## 2025-01-17 PROCEDURE — 76775 US EXAM ABDO BACK WALL LIM: CPT

## 2025-04-24 ENCOUNTER — HOSPITAL ENCOUNTER (OUTPATIENT)
Dept: HOSPITAL 103 - HO.LNP | Age: 65
Discharge: HOME | End: 2025-04-24
Payer: MEDICARE

## 2025-04-24 DIAGNOSIS — R79.89: Primary | ICD-10-CM

## 2025-04-24 LAB
ALBUMIN SERPL-MCNC: 4.2 G/DL (ref 3.5–5)
ALKALINE PHOSPHATASE: 70 U/L (ref 39–117)
ALT SERPL-CCNC: 34 U/L (ref 0–40)
ANION GAP SERPL CALC-SCNC: 10 MMOL/L (ref 12–20)
AST SERPL-CCNC: 37 U/L (ref 5–37)
BUN SERPL-MCNC: 18 MG/DL (ref 9–16)
CALCIUM: 9.6 MG/DL (ref 8.4–10.2)
CHLORIDE SERPL-SCNC: 107 MMOL/L (ref 96–108)
CHOLESTANOL SERPL-MCNC: 116 MG/DL (ref ?–200)
CO2 SERPL-SCNC: 28 MMOL/L (ref 22–29)
HCT VFR BLD AUTO: 41.7 % (ref 42–52)
HDLC SERPL-MCNC: 32 MG/DL (ref 40–?)
HGB BLD-MCNC: 13.7 G/DL (ref 14–18)
IMM GRANULOCYTES # BLD: 0.01 X10*3/UL (ref 0–0.03)
IMM GRANULOCYTES NFR BLD AUTO: 0.2 % (ref 0–0.4)
LYMPHOCYTES # SPEC AUTO: 2.1 X10*3/UL (ref 1.2–4.9)
MANUAL DIFF FLAG: NO
MCH RBC QN AUTO: 30.9 PG (ref 27–33)
MCHC RBC-ENTMCNC: 32.9 G/DL (ref 31–36)
MCV RBC: 93.9 FL (ref 80–98)
PLATELET # BLD AUTO: 288 X10*3/UL (ref 160–400)
PROT SERPL-MCNC: 6.8 G/DL (ref 6.5–8)
RED BLOOD COUNT: 4.44 X10*6/UL (ref 4.6–5.8)
SODIUM SERPL-SCNC: 141 MMOL/L (ref 135–145)
TRIGL SERPL-MCNC: 133 MG/DL (ref ?–150)
WBC # BLD: 5.2 X10*3/UL (ref 4.8–10.8)

## 2025-04-24 PROCEDURE — 85025 COMPLETE CBC W/AUTO DIFF WBC: CPT

## 2025-04-24 PROCEDURE — 80053 COMPREHEN METABOLIC PANEL: CPT

## 2025-04-24 PROCEDURE — 82248 BILIRUBIN DIRECT: CPT

## 2025-04-24 PROCEDURE — 80061 LIPID PANEL: CPT

## 2025-04-24 PROCEDURE — 80076 HEPATIC FUNCTION PANEL: CPT

## 2025-06-16 ENCOUNTER — APPOINTMENT (OUTPATIENT)
Dept: URBAN - METROPOLITAN AREA CLINIC 100 | Facility: CLINIC | Age: 65
Setting detail: DERMATOLOGY
End: 2025-06-16

## 2025-06-16 DIAGNOSIS — D22 MELANOCYTIC NEVI: ICD-10-CM

## 2025-06-16 DIAGNOSIS — Z85.820 PERSONAL HISTORY OF MALIGNANT MELANOMA OF SKIN: ICD-10-CM

## 2025-06-16 DIAGNOSIS — L82.1 OTHER SEBORRHEIC KERATOSIS: ICD-10-CM

## 2025-06-16 DIAGNOSIS — L57.0 ACTINIC KERATOSIS: ICD-10-CM

## 2025-06-16 DIAGNOSIS — L81.4 OTHER MELANIN HYPERPIGMENTATION: ICD-10-CM

## 2025-06-16 PROBLEM — D22.5 MELANOCYTIC NEVI OF TRUNK: Status: ACTIVE | Noted: 2025-06-16

## 2025-06-16 PROBLEM — D22.62 MELANOCYTIC NEVI OF LEFT UPPER LIMB, INCLUDING SHOULDER: Status: ACTIVE | Noted: 2025-06-16

## 2025-06-16 PROBLEM — D22.61 MELANOCYTIC NEVI OF RIGHT UPPER LIMB, INCLUDING SHOULDER: Status: ACTIVE | Noted: 2025-06-16

## 2025-06-16 PROCEDURE — ?

## 2025-06-16 PROCEDURE — ? COUNSELING

## 2025-06-16 PROCEDURE — ? LIQUID NITROGEN

## 2025-06-16 PROCEDURE — ? DIAGNOSIS COMMENT

## 2025-06-16 PROCEDURE — ?: Mod: 25

## 2025-06-16 ASSESSMENT — LOCATION SIMPLE DESCRIPTION DERM
LOCATION SIMPLE: RIGHT HAND
LOCATION SIMPLE: RIGHT UPPER BACK
LOCATION SIMPLE: ABDOMEN
LOCATION SIMPLE: LEFT UPPER BACK
LOCATION SIMPLE: UPPER BACK
LOCATION SIMPLE: CHEST
LOCATION SIMPLE: LEFT UPPER ARM
LOCATION SIMPLE: RIGHT UPPER ARM

## 2025-06-16 ASSESSMENT — LOCATION DETAILED DESCRIPTION DERM
LOCATION DETAILED: RIGHT SUPERIOR UPPER BACK
LOCATION DETAILED: LEFT SUPERIOR UPPER BACK
LOCATION DETAILED: XIPHOID
LOCATION DETAILED: RIGHT PROXIMAL POSTERIOR UPPER ARM
LOCATION DETAILED: LEFT PROXIMAL POSTERIOR UPPER ARM
LOCATION DETAILED: STERNUM
LOCATION DETAILED: PERIUMBILICAL SKIN
LOCATION DETAILED: LEFT LATERAL ABDOMEN
LOCATION DETAILED: SUPERIOR THORACIC SPINE
LOCATION DETAILED: RIGHT ULNAR DORSAL HAND

## 2025-06-16 ASSESSMENT — LOCATION ZONE DERM
LOCATION ZONE: ARM
LOCATION ZONE: HAND
LOCATION ZONE: TRUNK

## 2025-06-16 NOTE — PROCEDURE: COUNSELING
Quality 137: Melanoma: Continuity Of Care - Recall System: Patient information entered into a recall system that includes: target date for the next exam specified AND a process to follow up with patients regarding missed or unscheduled appointments
Detail Level: Detailed
When Should The Patient Follow-Up For Their Next Full-Body Skin Exam?: 6 Months
Detail Level: Zone
Declined